# Patient Record
Sex: MALE | Race: WHITE | Employment: FULL TIME | URBAN - METROPOLITAN AREA
[De-identification: names, ages, dates, MRNs, and addresses within clinical notes are randomized per-mention and may not be internally consistent; named-entity substitution may affect disease eponyms.]

---

## 2020-09-28 NOTE — H&P (VIEW-ONLY)
[facility] CLINIC NOTE:  
History of Present Illness: 
 
CC: Right greater than left upper extremity pain and left greater than right upper extremity numbness tingling and weakness Meggan Llamas is a 63 y.o.  male who presents today for evaluation of chronic right greater than left upper extremity pain and left greater than right upper extremity numbness tingling and weakness. The patient states he has been dealing with these symptoms for approximately 5 years time. He states that over the length of time he was treated with over-the-counter ibuprofen, tumor rec and gabapentin that he is now requiring up to 1000 mg to 1200 mg a day. He states that he has performed physical therapy exercises daily and found that they have done little to relieve the radicular symptoms but do treat the overall neck pain. He states that his pain is worse when trying to lie flat or in the night and awakens him from sleep. He states that he has had to sleep on multiple pillows almost a 60 degrees angle for many years now to find any degree of comfort so that he can get any level of sleep. He currently rates his pain as a 6-7 out of 10 on the visual analog pain scale. He states that this pain radiates down his right upper extremity to his thumb index and middle finger greater than his left upper extremity. He also endorses numbness and tingling in a similar distribution and weakness worse on his left upper extremity than his right. He finds that his symptoms are exacerbated with activity. A few years back he underwent a course of multiple epidural steroid injections which did little to alleviate his pain discomfort and felt that they were complete waste. He is a non-smoker who is relatively healthy without any use of antiplatelet or anticoagulant medications. 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 He is accompanied to today's visit by his wife.   The patient has an MRI cervical spine without contrast performed on 9/1/2020 at [address] that demonstrates degenerative disc disease primarily affecting the C5-C6 and C6-C7 levels. At C5-C6 there is moderate degenerative disc disease with a broad-based disc osteophyte complex slightly eccentric to the left that results in moderate central spinal stenosis and left greater than right neuroforaminal stenosis C6-C7 there is a central and slightly right eccentric broad-based disc osteophyte complex that results in mild central spinal stenosis. At C5-C6 there is severe left and moderate right neuroforaminal stenosis. At C6-C7 there is severe right and moderate left neuroforaminal stenosis. The spinal cord is of normal caliber and has no evidence of cervical myelomalacia. Past Medical History:  
Diagnosis Date  Arthritis   
 cervical  
 BMI 25.0-25.9,adult BMI 25.1  Chronic pain   
 neck Past Surgical History:  
Procedure Laterality Date  HX KNEE ARTHROSCOPY Right 1987 and 1993 No Known Allergies Family History Problem Relation Age of Onset  Diabetes Father  Heart Disease Father  COPD Father  Heart Attack Father  Heart Disease Brother  Heart Attack Brother  No Known Problems Mother  No Known Problems Sister  No Known Problems Sister  Diabetes Brother Social History Socioeconomic History  Marital status:  Spouse name: Not on file  Number of children: Not on file  Years of education: Not on file  Highest education level: Not on file Occupational History  Not on file Social Needs  Financial resource strain: Not on file  Food insecurity Worry: Not on file Inability: Not on file  Transportation needs Medical: Not on file Non-medical: Not on file Tobacco Use  Smoking status: Never Smoker  Smokeless tobacco: Never Used Substance and Sexual Activity  Alcohol use:  Yes  
 Alcohol/week: 1.0 standard drinks Types: 1 Glasses of wine per week Comment: occassion  Drug use: No  
 Sexual activity: Not on file Lifestyle  Physical activity Days per week: Not on file Minutes per session: Not on file  Stress: Not on file Relationships  Social connections Talks on phone: Not on file Gets together: Not on file Attends Taoism service: Not on file Active member of club or organization: Not on file Attends meetings of clubs or organizations: Not on file Relationship status: Not on file  Intimate partner violence Fear of current or ex partner: Not on file Emotionally abused: Not on file Physically abused: Not on file Forced sexual activity: Not on file Other Topics Concern  Not on file Social History Narrative  Not on file Current Outpatient Medications Medication Sig Dispense Refill  gabapentin (NEURONTIN) 100 mg capsule Take 100 mg by mouth three (3) times daily.  ibuprofen (AdviL) 200 mg tablet Take 200 mg by mouth as needed for Pain. There is no problem list on file for this patient. ROS Review of Systems Constitutional:                    No recent weight changes, fever, fatigue, POS sleep difficulties, loss of appetite ENT/Mouth:  No hearing loss, ringing in the ears, chronic sinus problem, nose bleeds,sore throat, voice change, hoarseness, swollen glands in neck, or difficulties with chewing and swallowing. Cardiovascular:  No chest pain/angina pectoris, palpitations, swelling of feet/ankles/hands, or calf pain while walking. Respiratory: No chronic or frequent coughs, spitting up blood, shortness of breath, asthma, or wheezing. Gastrointestinal: No abdominal pain, heartburn, nausea, vomiting, constipation, or frequent diarrhea Genitourinary: No frequent urination, burning or painful urination, or blood in urine Musculoskeletal:   POS joint pain, POS stiffness/swelling, weakness of muscles, or muscle pain/cramp Integument:   No rash/itching Neurological:  No dizziness/vertigo, POS numbness/tingling sensation, tremors, POS weakness in limbs, frequent or recurring headaches, memory loss or confusion. Endocrine: No excessive thirst or urination, heat or cold intolerance. Physical Exam: 
Vitals:  
 09/28/20 0931 BP: 130/80 Pulse: 73 Temp: 97.7 °F (36.5 °C) TempSrc: Temporal  
SpO2: 98% Weight: 200 lb 6.4 oz (90.9 kg) Height: 6' (1.829 m) PainSc:   6-7 PainLoc: Neck Body mass index is 27.18 kg/m². General: No acute distress Head normocephalic and atraumatic Mood and affect appropriate CV: Regular rate Resp: No increased work of breathing Skin: warm and dry Awake, alert, and oriented Speech fluent Eyes open spontaneously Face symmetric and tongue midline on protrusion Sternocleidomastoid and trapezius 5/5 No mid-line cervical, thoracic, or lumbar tenderness to palpation Patient with strength exam as follows:  
Upper Extremities: Right Left Deltoid  5 5 Biceps  5 5 Triceps 5 5   5 5 Hand Intrinsics  5 5 Wrist flexors/extensors  5 5 Lower Extremities: 
 
  Hip Flex 5 5 Quads  5 5 Hamstrings 5 5 Dorsiflex 5 5 Plantarflex 5 5 EHL  5 5 Sensation grossly intact to light touch Negative Spurling maneuver bilaterally Good range of motion with respect to the cervical spine DTR 1+ in the bilateral upper extremities and 2+ in the bilateral lower extremities No clonus No Mai's sign present bilaterally Negative sitting straight leg raise test on the right and left Gait: normal nonantalgic gait, stands from a seated position without difficulty and ambulates independently Assessment & Plan: 
All Day is a 63 y.o.  male who presents today for evaluation of chronic right greater than left upper extremity pain and left greater than right upper extremity numbness tingling and weakness. I have independently reviewed and interpreted the patient's MRI cervical spine without contrast performed on 9/1/2020 at [address] that demonstrates degenerative disc disease primarily affecting the C5-C6 and C6-C7 levels. At C5-C6 there is moderate degenerative disc disease with a broad-based disc osteophyte complex slightly eccentric to the left that results in moderate central spinal stenosis and left greater than right neuroforaminal stenosis C6-C7 there is a central and slightly right eccentric broad-based disc osteophyte complex that results in mild central spinal stenosis. At C5-C6 there is severe left and moderate right neuroforaminal stenosis. At C6-C7 there is severe right and moderate left neuroforaminal stenosis. The spinal cord is of normal caliber and has no evidence of cervical myelomalacia. At this time in my opinion the patient has failed conservative medical management is suffering from bilateral neuroforaminal stenosis that is result of his cervical spinal degenerative disc disease and disc osteophyte complexes that result both neuroforaminal and spinal stenosis. Given that he is failed conservative medical management I recommend proceeding with a C5-C6 and C6-C7 anterior cervical discectomy and fusion. Anterior Cervical Fusion Procedures Consent: The relative risks of complication include but are not limited to infection, bleeding, spinal fluid leak, major vascular injury, increased pain, weakness, numbness, non fusion, instrumentation failure, need for re operation, postoperative hematoma that may require surgical evacuation, dysphonia, dysphagia, carotid, jugular, esophogeal and/or tracheal, paralysis, incontinence, impotence, heart attack, stroke and death were discussed with patient  And family members present.  They have been provided the opportunity to ask any questions regarding the surgical procedure. The patient and family members present expressed understanding and agree to proceed with surgery. ICD-10-CM ICD-9-CM 1. Degenerative disc disease, cervical  M50.30 722.4 2. Cervical spinal stenosis  M48.02 723.0 3. Cervical radiculopathy  M54.12 723.4 4. Neuroforaminal stenosis of cervical spine  M99.81 723.0 5. Disc-osteophyte complex  M50.30 722.4 M25.78 721.8 Notes are transcribed with [employer], a medical voice recording dictation service, and may contain minor errors. Rere Isbell. [facility] [address] [facility]

## 2020-10-12 ENCOUNTER — HOSPITAL ENCOUNTER (OUTPATIENT)
Dept: SURGERY | Age: 55
Discharge: HOME | End: 2020-10-12
Payer: COMMERCIAL

## 2020-10-12 VITALS
RESPIRATION RATE: 16 BRPM | OXYGEN SATURATION: 99 % | BODY MASS INDEX: 27.2 KG/M2 | SYSTOLIC BLOOD PRESSURE: 128 MMHG | HEIGHT: 72 IN | WEIGHT: 200.8 LBS | TEMPERATURE: 98 F | HEART RATE: 63 BPM | DIASTOLIC BLOOD PRESSURE: 69 MMHG

## 2020-10-12 LAB
ANION GAP SERPL CALC-SCNC: 3 MMOL/L (ref 7–16)
APPEARANCE UR: CLEAR
BACTERIA SPEC CULT: NORMAL
BASOPHILS # BLD: 0.1 K/UL (ref 0–0.2)
BASOPHILS NFR BLD: 1 % (ref 0–2)
BILIRUB UR QL: NEGATIVE
BUN SERPL-MCNC: 15 MG/DL (ref 6–23)
CALCIUM SERPL-MCNC: 9.2 MG/DL (ref 8.3–10.4)
CHLORIDE SERPL-SCNC: 105 MMOL/L (ref 98–107)
CO2 SERPL-SCNC: 33 MMOL/L (ref 21–32)
COLOR UR: YELLOW
CREAT SERPL-MCNC: 1.15 MG/DL (ref 0.8–1.5)
DIFFERENTIAL METHOD BLD: ABNORMAL
EOSINOPHIL # BLD: 0.1 K/UL (ref 0–0.8)
EOSINOPHIL NFR BLD: 2 % (ref 0.5–7.8)
ERYTHROCYTE [DISTWIDTH] IN BLOOD BY AUTOMATED COUNT: 11.7 % (ref 11.9–14.6)
GLUCOSE SERPL-MCNC: 82 MG/DL (ref 65–100)
GLUCOSE UR STRIP.AUTO-MCNC: NEGATIVE MG/DL
HCT VFR BLD AUTO: 43.6 % (ref 41.1–50.3)
HGB BLD-MCNC: 15.2 G/DL (ref 13.6–17.2)
HGB UR QL STRIP: NEGATIVE
IMM GRANULOCYTES # BLD AUTO: 0 K/UL (ref 0–0.5)
IMM GRANULOCYTES NFR BLD AUTO: 0 % (ref 0–5)
KETONES UR QL STRIP.AUTO: NEGATIVE MG/DL
LEUKOCYTE ESTERASE UR QL STRIP.AUTO: NEGATIVE
LYMPHOCYTES # BLD: 1.6 K/UL (ref 0.5–4.6)
LYMPHOCYTES NFR BLD: 37 % (ref 13–44)
MCH RBC QN AUTO: 30.7 PG (ref 26.1–32.9)
MCHC RBC AUTO-ENTMCNC: 34.9 G/DL (ref 31.4–35)
MCV RBC AUTO: 88.1 FL (ref 79.6–97.8)
MONOCYTES # BLD: 0.4 K/UL (ref 0.1–1.3)
MONOCYTES NFR BLD: 9 % (ref 4–12)
NEUTS SEG # BLD: 2.3 K/UL (ref 1.7–8.2)
NEUTS SEG NFR BLD: 51 % (ref 43–78)
NITRITE UR QL STRIP.AUTO: NEGATIVE
NRBC # BLD: 0 K/UL (ref 0–0.2)
PH UR STRIP: 8 [PH] (ref 5–9)
PLATELET # BLD AUTO: 215 K/UL (ref 150–450)
PMV BLD AUTO: 9.6 FL (ref 9.4–12.3)
POTASSIUM SERPL-SCNC: 4.1 MMOL/L (ref 3.5–5.1)
PROT UR STRIP-MCNC: NEGATIVE MG/DL
RBC # BLD AUTO: 4.95 M/UL (ref 4.23–5.6)
SERVICE CMNT-IMP: NORMAL
SODIUM SERPL-SCNC: 141 MMOL/L (ref 136–145)
SP GR UR REFRACTOMETRY: 1.01 (ref 1–1.02)
UROBILINOGEN UR QL STRIP.AUTO: 0.2 EU/DL (ref 0.2–1)
WBC # BLD AUTO: 4.4 K/UL (ref 4.3–11.1)

## 2020-10-12 PROCEDURE — 85025 COMPLETE CBC W/AUTO DIFF WBC: CPT

## 2020-10-12 PROCEDURE — 87641 MR-STAPH DNA AMP PROBE: CPT

## 2020-10-12 PROCEDURE — 81003 URINALYSIS AUTO W/O SCOPE: CPT

## 2020-10-12 PROCEDURE — 80048 BASIC METABOLIC PNL TOTAL CA: CPT

## 2020-10-12 PROCEDURE — 77030027138 HC INCENT SPIROMETER -A

## 2020-10-12 RX ORDER — ACETAMINOPHEN 500 MG
1000 TABLET ORAL
Status: ON HOLD | COMMUNITY
End: 2020-10-23 | Stop reason: SDUPTHER

## 2020-10-12 NOTE — PERIOP NOTES
Patient verified name & . Order to obtain consent not currently found in EHR. Pt verbalizes understanding of 1 visitor policy. TYPE  CASE:2              Orders: not yet received  Labs per Spine protocol:  CBC with dif, BMP, Mrsa/MSSA nasal swab and UA   Labs per anesthesia protocol: none  EKG/cardiac records  :  Not indicated  Glucose: not indicated    Patient verbalizes understanding to if not notified of appointment for COVID-19 swab within the 7 business days prior to surgery, call [phone]. Medication list updated today. Pt did not bring medication bottles or updated list today, but provided list of medications from memory to the best of their ability. Pt answered medical and surgical history questions to the best of their ability. Pt viewed Spine Pre-hab Video. All further questions were addressed. Pt was provided with antibacterial or non-moisturizing soap, Hibiclens, lSpine Recovery booklet and incentive spirometer. Handouts and all Surgery instructions provided to pt and pt verbalizes understanding. Patient Guide to Surgery Packet provided to patient. Packet includes Patient Guide to surgery handout, Facts about Pain Management handout, Facts about Urinary Catherization handout, Hand Hygiene handout, Patient Education and Teaching Sheet -Transfusion of Blood and Blood Products handout, and  [facility] frequent question and answer sheet. Guide reviewed with the patient and all questions answered to the satisfaction of the patient. Pt advised to visit www. [email or web address] for more educational information regarding anesthesia and to record any additional questions that arise so that it can be addressed by the anesthesiologist on the morning of surgery. Patient instructed on the following and verbalized understanding:  Arrive at MAIN entrance, time of arrival to be called the day before by 1700.   Responsible adult must drive patient to and from hospital, stay during surgery and 24 hours postoperatively. Npo after midnight including gum, mints and ice chips. Shower using hibiclens the night before and the morning of surgery. Hibiclens provided to the patient with handout and verbal instructions for use. Leave all valuables at home. Instructed on no jewelry or body piercings on the dos. Bring insurance card and ID. No perfumes, oil, powder, colognes, makeup or  lotions on the skin. Patient verbalized understanding of all instructions and provided all medical/health information to the best of their ability.

## 2020-10-12 NOTE — PERIOP NOTES
PLEASE CONTINUE TAKING ALL PRESCRIPTION MEDICATIONS UP TO THE DAY OF SURGERY UNLESS OTHERWISE DIRECTED BELOW. DISCONTINUE ALL vitamins and supplements 7 days prior to surgery. DISCONTINUE Non-Steriodal Anti-Inflammatory (NSAIDS) such as Advil and Aleve 5 days prior to surgery. Home Medications to take  the day of surgery    gabapentin / Neurontin           Home Medications   to Hold   Ibuprofen - hold 5 days prior to surgery   DISCONTINUE ALL vitamins and supplements 7 days prior to surgery. DISCONTINUE Non-Steriodal Anti-Inflammatory (NSAIDS) such as Advil and Aleve 5 days prior to surgery. Comments          *Visitor policy of 1 visitor per patient discussed. Please do not bring home medications with you on the day of surgery unless otherwise directed by your nurse. If you are instructed to bring home medications, please give them to your nurse as they will be administered by the nursing staff. If you have any questions, please call [facility] Danyelle Valencia [phone] or [address] [place] [address] [phone]. A copy of this note was provided to the patient for reference.

## 2020-10-12 NOTE — PERIOP NOTES
Recent Results (from the past 12 hour(s))   CBC WITH AUTOMATED DIFF    Collection Time: 10/12/20  1:23 PM   Result Value Ref Range    WBC 4.4 4.3 - 11.1 K/uL    RBC 4.95 4.23 - 5.6 M/uL    HGB 15.2 13.6 - 17.2 g/dL    HCT 43.6 41.1 - 50.3 %    MCV 88.1 79.6 - 97.8 FL    MCH 30.7 26.1 - 32.9 PG    MCHC 34.9 31.4 - 35.0 g/dL    RDW 11.7 (L) 11.9 - 14.6 %    PLATELET 009 935 - 185 K/uL    MPV 9.6 9.4 - 12.3 FL    ABSOLUTE NRBC 0.00 0.0 - 0.2 K/uL    DF AUTOMATED      NEUTROPHILS 51 43 - 78 %    LYMPHOCYTES 37 13 - 44 %    MONOCYTES 9 4.0 - 12.0 %    EOSINOPHILS 2 0.5 - 7.8 %    BASOPHILS 1 0.0 - 2.0 %    IMMATURE GRANULOCYTES 0 0.0 - 5.0 %    ABS. NEUTROPHILS 2.3 1.7 - 8.2 K/UL    ABS. LYMPHOCYTES 1.6 0.5 - 4.6 K/UL    ABS. MONOCYTES 0.4 0.1 - 1.3 K/UL    ABS. EOSINOPHILS 0.1 0.0 - 0.8 K/UL    ABS. BASOPHILS 0.1 0.0 - 0.2 K/UL    ABS. IMM. GRANS. 0.0 0.0 - 0.5 K/UL   METABOLIC PANEL, BASIC    Collection Time: 10/12/20  1:23 PM   Result Value Ref Range    Sodium 141 136 - 145 mmol/L    Potassium 4.1 3.5 - 5.1 mmol/L    Chloride 105 98 - 107 mmol/L    CO2 33 (H) 21 - 32 mmol/L    Anion gap 3 (L) 7 - 16 mmol/L    Glucose 82 65 - 100 mg/dL    BUN 15 6 - 23 MG/DL    Creatinine 1.15 0.8 - 1.5 MG/DL    GFR est AA >60 >60 ml/min/1.73m2    GFR est non-AA >60 >60 ml/min/1.73m2    Calcium 9.2 8.3 - 10.4 MG/DL   MSSA/MRSA SC BY PCR, NASAL SWAB    Collection Time: 10/12/20  1:23 PM    Specimen: Nasal swab   Result Value Ref Range    Special Requests: NO SPECIAL REQUESTS      Culture result:        SA target not detected. A MRSA NEGATIVE, SA NEGATIVE test result does not preclude MRSA or SA nasal colonization.    URINALYSIS W/ RFLX MICROSCOPIC    Collection Time: 10/12/20  1:23 PM   Result Value Ref Range    Color YELLOW      Appearance CLEAR      Specific gravity 1.008 1.001 - 1.023      pH (UA) 8.0 5.0 - 9.0      Protein Negative NEG mg/dL    Glucose Negative mg/dL    Ketone Negative NEG mg/dL Bilirubin Negative NEG      Blood Negative NEG      Urobilinogen 0.2 0.2 - 1.0 EU/dL    Nitrites Negative NEG      Leukocyte Esterase Negative NEG      Lab results reviewed.

## 2020-10-21 ENCOUNTER — ANESTHESIA EVENT (OUTPATIENT)
Dept: SURGERY | Age: 55
DRG: 473 | End: 2020-10-21
Payer: COMMERCIAL

## 2020-10-22 ENCOUNTER — ANESTHESIA (OUTPATIENT)
Dept: SURGERY | Age: 55
DRG: 473 | End: 2020-10-22
Payer: COMMERCIAL

## 2020-10-22 ENCOUNTER — APPOINTMENT (OUTPATIENT)
Dept: GENERAL RADIOLOGY | Age: 55
DRG: 473 | End: 2020-10-22
Attending: NEUROLOGICAL SURGERY
Payer: COMMERCIAL

## 2020-10-22 ENCOUNTER — HOSPITAL ENCOUNTER (INPATIENT)
Age: 55
LOS: 1 days | Discharge: HOME | DRG: 473 | End: 2020-10-23
Attending: NEUROLOGICAL SURGERY | Admitting: NEUROLOGICAL SURGERY
Payer: COMMERCIAL

## 2020-10-22 DIAGNOSIS — M50.30 DISC-OSTEOPHYTE COMPLEX: ICD-10-CM

## 2020-10-22 DIAGNOSIS — M54.12 CERVICAL RADICULOPATHY: ICD-10-CM

## 2020-10-22 DIAGNOSIS — M48.02 CERVICAL SPINAL STENOSIS: Primary | ICD-10-CM

## 2020-10-22 DIAGNOSIS — M50.30 DEGENERATIVE DISC DISEASE, CERVICAL: ICD-10-CM

## 2020-10-22 DIAGNOSIS — M48.02 NEUROFORAMINAL STENOSIS OF CERVICAL SPINE: ICD-10-CM

## 2020-10-22 DIAGNOSIS — M25.78 DISC-OSTEOPHYTE COMPLEX: ICD-10-CM

## 2020-10-22 LAB
ABO + RH BLD: NORMAL
BLOOD GROUP ANTIBODIES SERPL: NORMAL
SPECIMEN EXP DATE BLD: NORMAL

## 2020-10-22 PROCEDURE — 86900 BLOOD TYPING SEROLOGIC ABO: CPT

## 2020-10-22 PROCEDURE — 0RT30ZZ RESECTION OF CERVICAL VERTEBRAL DISC, OPEN APPROACH: ICD-10-PCS | Performed by: NEUROLOGICAL SURGERY

## 2020-10-22 PROCEDURE — 77030039155 HC CGE SPN ANT CERV TRITANIUM STRY -G: Performed by: NEUROLOGICAL SURGERY

## 2020-10-22 PROCEDURE — 77030038552 HC DRN WND MDII -A: Performed by: NEUROLOGICAL SURGERY

## 2020-10-22 PROCEDURE — 77030009868 HC PIN DISTR CASPR AESC -B: Performed by: NEUROLOGICAL SURGERY

## 2020-10-22 PROCEDURE — 74011250636 HC RX REV CODE- 250/636: Performed by: NEUROLOGICAL SURGERY

## 2020-10-22 PROCEDURE — 74011250637 HC RX REV CODE- 250/637: Performed by: ANESTHESIOLOGY

## 2020-10-22 PROCEDURE — 74011250636 HC RX REV CODE- 250/636: Performed by: REGISTERED NURSE

## 2020-10-22 PROCEDURE — 77030027138 HC INCENT SPIROMETER -A

## 2020-10-22 PROCEDURE — 74011000272 HC RX REV CODE- 272: Performed by: NEUROLOGICAL SURGERY

## 2020-10-22 PROCEDURE — 74011250637 HC RX REV CODE- 250/637: Performed by: NEUROLOGICAL SURGERY

## 2020-10-22 PROCEDURE — 74011250636 HC RX REV CODE- 250/636: Performed by: ANESTHESIOLOGY

## 2020-10-22 PROCEDURE — C1713 ANCHOR/SCREW BN/BN,TIS/BN: HCPCS | Performed by: NEUROLOGICAL SURGERY

## 2020-10-22 PROCEDURE — 77030040830 HC CATH URETH FOL MDII -A: Performed by: NEUROLOGICAL SURGERY

## 2020-10-22 PROCEDURE — 77030025420 HC BUR NEUR TPS STRY -C: Performed by: NEUROLOGICAL SURGERY

## 2020-10-22 PROCEDURE — 76060000039 HC ANESTHESIA 4 TO 4.5 HR: Performed by: NEUROLOGICAL SURGERY

## 2020-10-22 PROCEDURE — 77030020268 HC MISC GENERAL SUPPLY: Performed by: NEUROLOGICAL SURGERY

## 2020-10-22 PROCEDURE — 77030003666 HC NDL SPINAL BD -A: Performed by: NEUROLOGICAL SURGERY

## 2020-10-22 PROCEDURE — 65270000029 HC RM PRIVATE

## 2020-10-22 PROCEDURE — 74011000250 HC RX REV CODE- 250: Performed by: NEUROLOGICAL SURGERY

## 2020-10-22 PROCEDURE — 01N10ZZ RELEASE CERVICAL NERVE, OPEN APPROACH: ICD-10-PCS | Performed by: NEUROLOGICAL SURGERY

## 2020-10-22 PROCEDURE — 77030040106 HC FCPS BIPOLAR DISP INLC -D: Performed by: NEUROLOGICAL SURGERY

## 2020-10-22 PROCEDURE — 77030028270 HC SRGFL HEMSTAT MTRX J&J -C: Performed by: NEUROLOGICAL SURGERY

## 2020-10-22 PROCEDURE — 77030029099 HC BN WAX SSPC -A: Performed by: NEUROLOGICAL SURGERY

## 2020-10-22 PROCEDURE — 74011000250 HC RX REV CODE- 250: Performed by: REGISTERED NURSE

## 2020-10-22 PROCEDURE — 77030014007 HC SPNG HEMSTAT J&J -B: Performed by: NEUROLOGICAL SURGERY

## 2020-10-22 PROCEDURE — 77030003029 HC SUT VCRL J&J -B: Performed by: NEUROLOGICAL SURGERY

## 2020-10-22 PROCEDURE — 77030002933 HC SUT MCRYL J&J -A: Performed by: NEUROLOGICAL SURGERY

## 2020-10-22 PROCEDURE — 77030011267 HC ELECTRD BLD COVD -A: Performed by: NEUROLOGICAL SURGERY

## 2020-10-22 PROCEDURE — 77030010507 HC ADH SKN DERMBND J&J -B: Performed by: NEUROLOGICAL SURGERY

## 2020-10-22 PROCEDURE — 77030019908 HC STETH ESOPH SIMS -A: Performed by: ANESTHESIOLOGY

## 2020-10-22 PROCEDURE — 00NW0ZZ RELEASE CERVICAL SPINAL CORD, OPEN APPROACH: ICD-10-PCS | Performed by: NEUROLOGICAL SURGERY

## 2020-10-22 PROCEDURE — 76010000175 HC OR TIME 4 TO 4.5 HR INTENSV-TIER 1: Performed by: NEUROLOGICAL SURGERY

## 2020-10-22 PROCEDURE — 77030034475 HC MISC IMPL SPN: Performed by: NEUROLOGICAL SURGERY

## 2020-10-22 PROCEDURE — 77030039425 HC BLD LARYNG TRULITE DISP TELE -A: Performed by: ANESTHESIOLOGY

## 2020-10-22 PROCEDURE — 77030012894: Performed by: NEUROLOGICAL SURGERY

## 2020-10-22 PROCEDURE — 77030037088 HC TUBE ENDOTRACH ORAL NSL COVD-A: Performed by: ANESTHESIOLOGY

## 2020-10-22 PROCEDURE — 72040 X-RAY EXAM NECK SPINE 2-3 VW: CPT

## 2020-10-22 PROCEDURE — 2709999900 HC NON-CHARGEABLE SUPPLY

## 2020-10-22 PROCEDURE — 76210000016 HC OR PH I REC 1 TO 1.5 HR: Performed by: NEUROLOGICAL SURGERY

## 2020-10-22 PROCEDURE — 2709999900 HC NON-CHARGEABLE SUPPLY: Performed by: NEUROLOGICAL SURGERY

## 2020-10-22 PROCEDURE — 0RG20A0 FUSION OF 2 OR MORE CERVICAL VERTEBRAL JOINTS WITH INTERBODY FUSION DEVICE, ANTERIOR APPROACH, ANTERIOR COLUMN, OPEN APPROACH: ICD-10-PCS | Performed by: NEUROLOGICAL SURGERY

## 2020-10-22 DEVICE — ANTERIOR CERVICAL CAGE
Type: IMPLANTABLE DEVICE | Site: SPINE CERVICAL | Status: FUNCTIONAL
Brand: TRITANIUM C

## 2020-10-22 DEVICE — SCREW SPNL L14MM DIA4MM SELF STARTING VAR ANT CERV TI OZARK: Type: IMPLANTABLE DEVICE | Site: SPINE CERVICAL | Status: FUNCTIONAL

## 2020-10-22 DEVICE — BIO DBM PUTTY
Type: IMPLANTABLE DEVICE | Site: SPINE CERVICAL | Status: FUNCTIONAL
Brand: BIO DBM

## 2020-10-22 RX ORDER — HALOPERIDOL 5 MG/ML
1 INJECTION INTRAMUSCULAR
Status: DISCONTINUED | OUTPATIENT
Start: 2020-10-22 | End: 2020-10-22 | Stop reason: HOSPADM

## 2020-10-22 RX ORDER — HYDROMORPHONE HYDROCHLORIDE 2 MG/ML
0.5 INJECTION, SOLUTION INTRAMUSCULAR; INTRAVENOUS; SUBCUTANEOUS
Status: DISCONTINUED | OUTPATIENT
Start: 2020-10-22 | End: 2020-10-22 | Stop reason: HOSPADM

## 2020-10-22 RX ORDER — GABAPENTIN 300 MG/1
300 CAPSULE ORAL
Status: COMPLETED | OUTPATIENT
Start: 2020-10-22 | End: 2020-10-22

## 2020-10-22 RX ORDER — ONDANSETRON 2 MG/ML
INJECTION INTRAMUSCULAR; INTRAVENOUS AS NEEDED
Status: DISCONTINUED | OUTPATIENT
Start: 2020-10-22 | End: 2020-10-22 | Stop reason: HOSPADM

## 2020-10-22 RX ORDER — CEFAZOLIN SODIUM/WATER 2 G/20 ML
2 SYRINGE (ML) INTRAVENOUS ONCE
Status: COMPLETED | OUTPATIENT
Start: 2020-10-22 | End: 2020-10-22

## 2020-10-22 RX ORDER — PROPOFOL 10 MG/ML
INJECTION, EMULSION INTRAVENOUS AS NEEDED
Status: DISCONTINUED | OUTPATIENT
Start: 2020-10-22 | End: 2020-10-22 | Stop reason: HOSPADM

## 2020-10-22 RX ORDER — SODIUM CHLORIDE 0.9 % (FLUSH) 0.9 %
5-40 SYRINGE (ML) INJECTION AS NEEDED
Status: DISCONTINUED | OUTPATIENT
Start: 2020-10-22 | End: 2020-10-22 | Stop reason: HOSPADM

## 2020-10-22 RX ORDER — CEFAZOLIN SODIUM/WATER 2 G/20 ML
2 SYRINGE (ML) INTRAVENOUS EVERY 8 HOURS
Status: COMPLETED | OUTPATIENT
Start: 2020-10-22 | End: 2020-10-23

## 2020-10-22 RX ORDER — LIDOCAINE HYDROCHLORIDE 10 MG/ML
0.1 INJECTION INFILTRATION; PERINEURAL AS NEEDED
Status: DISCONTINUED | OUTPATIENT
Start: 2020-10-22 | End: 2020-10-22 | Stop reason: HOSPADM

## 2020-10-22 RX ORDER — NEOSTIGMINE METHYLSULFATE 1 MG/ML
INJECTION, SOLUTION INTRAVENOUS AS NEEDED
Status: DISCONTINUED | OUTPATIENT
Start: 2020-10-22 | End: 2020-10-22 | Stop reason: HOSPADM

## 2020-10-22 RX ORDER — MIDAZOLAM HYDROCHLORIDE 1 MG/ML
2 INJECTION, SOLUTION INTRAMUSCULAR; INTRAVENOUS
Status: DISCONTINUED | OUTPATIENT
Start: 2020-10-22 | End: 2020-10-22 | Stop reason: HOSPADM

## 2020-10-22 RX ORDER — VANCOMYCIN HYDROCHLORIDE 1 G/20ML
INJECTION, POWDER, LYOPHILIZED, FOR SOLUTION INTRAVENOUS AS NEEDED
Status: DISCONTINUED | OUTPATIENT
Start: 2020-10-22 | End: 2020-10-22 | Stop reason: HOSPADM

## 2020-10-22 RX ORDER — HYDROMORPHONE HYDROCHLORIDE 1 MG/ML
1 INJECTION, SOLUTION INTRAMUSCULAR; INTRAVENOUS; SUBCUTANEOUS
Status: DISCONTINUED | OUTPATIENT
Start: 2020-10-22 | End: 2020-10-23 | Stop reason: HOSPADM

## 2020-10-22 RX ORDER — NALOXONE HYDROCHLORIDE 0.4 MG/ML
0.1 INJECTION, SOLUTION INTRAMUSCULAR; INTRAVENOUS; SUBCUTANEOUS
Status: DISCONTINUED | OUTPATIENT
Start: 2020-10-22 | End: 2020-10-22 | Stop reason: HOSPADM

## 2020-10-22 RX ORDER — SODIUM CHLORIDE 0.9 % (FLUSH) 0.9 %
5-40 SYRINGE (ML) INJECTION EVERY 8 HOURS
Status: DISCONTINUED | OUTPATIENT
Start: 2020-10-22 | End: 2020-10-22 | Stop reason: HOSPADM

## 2020-10-22 RX ORDER — GLYCOPYRROLATE 0.2 MG/ML
INJECTION INTRAMUSCULAR; INTRAVENOUS AS NEEDED
Status: DISCONTINUED | OUTPATIENT
Start: 2020-10-22 | End: 2020-10-22 | Stop reason: HOSPADM

## 2020-10-22 RX ORDER — KETOROLAC TROMETHAMINE 30 MG/ML
INJECTION, SOLUTION INTRAMUSCULAR; INTRAVENOUS AS NEEDED
Status: DISCONTINUED | OUTPATIENT
Start: 2020-10-22 | End: 2020-10-22 | Stop reason: HOSPADM

## 2020-10-22 RX ORDER — SODIUM CHLORIDE 0.9 % (FLUSH) 0.9 %
5-40 SYRINGE (ML) INJECTION EVERY 8 HOURS
Status: DISCONTINUED | OUTPATIENT
Start: 2020-10-22 | End: 2020-10-23 | Stop reason: HOSPADM

## 2020-10-22 RX ORDER — NALOXONE HYDROCHLORIDE 0.4 MG/ML
0.4 INJECTION, SOLUTION INTRAMUSCULAR; INTRAVENOUS; SUBCUTANEOUS AS NEEDED
Status: DISCONTINUED | OUTPATIENT
Start: 2020-10-22 | End: 2020-10-23 | Stop reason: HOSPADM

## 2020-10-22 RX ORDER — FENTANYL CITRATE 50 UG/ML
INJECTION, SOLUTION INTRAMUSCULAR; INTRAVENOUS AS NEEDED
Status: DISCONTINUED | OUTPATIENT
Start: 2020-10-22 | End: 2020-10-22 | Stop reason: HOSPADM

## 2020-10-22 RX ORDER — DEXAMETHASONE SODIUM PHOSPHATE 4 MG/ML
INJECTION, SOLUTION INTRA-ARTICULAR; INTRALESIONAL; INTRAMUSCULAR; INTRAVENOUS; SOFT TISSUE AS NEEDED
Status: DISCONTINUED | OUTPATIENT
Start: 2020-10-22 | End: 2020-10-22 | Stop reason: HOSPADM

## 2020-10-22 RX ORDER — LIDOCAINE HYDROCHLORIDE 20 MG/ML
INJECTION, SOLUTION EPIDURAL; INFILTRATION; INTRACAUDAL; PERINEURAL AS NEEDED
Status: DISCONTINUED | OUTPATIENT
Start: 2020-10-22 | End: 2020-10-22 | Stop reason: HOSPADM

## 2020-10-22 RX ORDER — SODIUM CHLORIDE 0.9 % (FLUSH) 0.9 %
5-40 SYRINGE (ML) INJECTION AS NEEDED
Status: DISCONTINUED | OUTPATIENT
Start: 2020-10-22 | End: 2020-10-23 | Stop reason: HOSPADM

## 2020-10-22 RX ORDER — HYDROCODONE BITARTRATE AND ACETAMINOPHEN 10; 325 MG/1; MG/1
1 TABLET ORAL
Status: DISCONTINUED | OUTPATIENT
Start: 2020-10-22 | End: 2020-10-23 | Stop reason: HOSPADM

## 2020-10-22 RX ORDER — OXYCODONE HYDROCHLORIDE 5 MG/1
5 TABLET ORAL
Status: DISCONTINUED | OUTPATIENT
Start: 2020-10-22 | End: 2020-10-22 | Stop reason: HOSPADM

## 2020-10-22 RX ORDER — DIPHENHYDRAMINE HYDROCHLORIDE 50 MG/ML
12.5 INJECTION, SOLUTION INTRAMUSCULAR; INTRAVENOUS
Status: DISCONTINUED | OUTPATIENT
Start: 2020-10-22 | End: 2020-10-22 | Stop reason: HOSPADM

## 2020-10-22 RX ORDER — EPHEDRINE SULFATE/0.9% NACL/PF 50 MG/5 ML
SYRINGE (ML) INTRAVENOUS AS NEEDED
Status: DISCONTINUED | OUTPATIENT
Start: 2020-10-22 | End: 2020-10-22 | Stop reason: HOSPADM

## 2020-10-22 RX ORDER — CYCLOBENZAPRINE HCL 10 MG
10 TABLET ORAL
Status: DISCONTINUED | OUTPATIENT
Start: 2020-10-22 | End: 2020-10-23 | Stop reason: HOSPADM

## 2020-10-22 RX ORDER — ROCURONIUM BROMIDE 10 MG/ML
INJECTION, SOLUTION INTRAVENOUS AS NEEDED
Status: DISCONTINUED | OUTPATIENT
Start: 2020-10-22 | End: 2020-10-22 | Stop reason: HOSPADM

## 2020-10-22 RX ORDER — ACETAMINOPHEN 500 MG
1000 TABLET ORAL ONCE
Status: COMPLETED | OUTPATIENT
Start: 2020-10-22 | End: 2020-10-22

## 2020-10-22 RX ORDER — FLUMAZENIL 0.1 MG/ML
0.2 INJECTION INTRAVENOUS
Status: DISCONTINUED | OUTPATIENT
Start: 2020-10-22 | End: 2020-10-22 | Stop reason: HOSPADM

## 2020-10-22 RX ORDER — SODIUM CHLORIDE, SODIUM LACTATE, POTASSIUM CHLORIDE, CALCIUM CHLORIDE 600; 310; 30; 20 MG/100ML; MG/100ML; MG/100ML; MG/100ML
100 INJECTION, SOLUTION INTRAVENOUS CONTINUOUS
Status: DISCONTINUED | OUTPATIENT
Start: 2020-10-22 | End: 2020-10-22 | Stop reason: HOSPADM

## 2020-10-22 RX ORDER — SODIUM CHLORIDE, SODIUM LACTATE, POTASSIUM CHLORIDE, CALCIUM CHLORIDE 600; 310; 30; 20 MG/100ML; MG/100ML; MG/100ML; MG/100ML
75 INJECTION, SOLUTION INTRAVENOUS CONTINUOUS
Status: DISCONTINUED | OUTPATIENT
Start: 2020-10-22 | End: 2020-10-22 | Stop reason: HOSPADM

## 2020-10-22 RX ORDER — ONDANSETRON 2 MG/ML
4 INJECTION INTRAMUSCULAR; INTRAVENOUS
Status: DISCONTINUED | OUTPATIENT
Start: 2020-10-22 | End: 2020-10-23 | Stop reason: HOSPADM

## 2020-10-22 RX ORDER — BUPIVACAINE HYDROCHLORIDE AND EPINEPHRINE 2.5; 5 MG/ML; UG/ML
INJECTION, SOLUTION EPIDURAL; INFILTRATION; INTRACAUDAL; PERINEURAL AS NEEDED
Status: DISCONTINUED | OUTPATIENT
Start: 2020-10-22 | End: 2020-10-22 | Stop reason: HOSPADM

## 2020-10-22 RX ADMIN — Medication 1 SPRAY: at 17:09

## 2020-10-22 RX ADMIN — FENTANYL CITRATE 25 MCG: 50 INJECTION INTRAMUSCULAR; INTRAVENOUS at 13:50

## 2020-10-22 RX ADMIN — HYDROMORPHONE HYDROCHLORIDE 0.5 MG: 2 INJECTION INTRAMUSCULAR; INTRAVENOUS; SUBCUTANEOUS at 14:56

## 2020-10-22 RX ADMIN — CEFAZOLIN SODIUM 2 G: 100 INJECTION, POWDER, LYOPHILIZED, FOR SOLUTION INTRAVENOUS at 22:14

## 2020-10-22 RX ADMIN — CYCLOBENZAPRINE 10 MG: 10 TABLET, FILM COATED ORAL at 22:13

## 2020-10-22 RX ADMIN — FENTANYL CITRATE 25 MCG: 50 INJECTION INTRAMUSCULAR; INTRAVENOUS at 13:33

## 2020-10-22 RX ADMIN — FENTANYL CITRATE 25 MCG: 50 INJECTION INTRAMUSCULAR; INTRAVENOUS at 13:30

## 2020-10-22 RX ADMIN — PHENYLEPHRINE HYDROCHLORIDE 60 MCG: 10 INJECTION INTRAVENOUS at 12:05

## 2020-10-22 RX ADMIN — Medication 3 AMPULE: at 09:40

## 2020-10-22 RX ADMIN — KETOROLAC TROMETHAMINE 30 MG: 30 INJECTION, SOLUTION INTRAMUSCULAR; INTRAVENOUS at 13:50

## 2020-10-22 RX ADMIN — Medication 5 ML: at 17:09

## 2020-10-22 RX ADMIN — PHENYLEPHRINE HYDROCHLORIDE 60 MCG: 10 INJECTION INTRAVENOUS at 12:21

## 2020-10-22 RX ADMIN — SODIUM CHLORIDE, SODIUM LACTATE, POTASSIUM CHLORIDE, AND CALCIUM CHLORIDE 100 ML/HR: 600; 310; 30; 20 INJECTION, SOLUTION INTRAVENOUS at 10:16

## 2020-10-22 RX ADMIN — PROPOFOL 200 MG: 10 INJECTION, EMULSION INTRAVENOUS at 10:38

## 2020-10-22 RX ADMIN — ONDANSETRON 4 MG: 2 INJECTION INTRAMUSCULAR; INTRAVENOUS at 10:44

## 2020-10-22 RX ADMIN — Medication 3 MG: at 14:02

## 2020-10-22 RX ADMIN — ONDANSETRON 4 MG: 2 INJECTION INTRAMUSCULAR; INTRAVENOUS at 23:05

## 2020-10-22 RX ADMIN — Medication 1 AMPULE: at 22:13

## 2020-10-22 RX ADMIN — ROCURONIUM BROMIDE 50 MG: 10 INJECTION, SOLUTION INTRAVENOUS at 10:38

## 2020-10-22 RX ADMIN — Medication 2 G: at 10:44

## 2020-10-22 RX ADMIN — LIDOCAINE HYDROCHLORIDE 100 MG: 20 INJECTION, SOLUTION EPIDURAL; INFILTRATION; INTRACAUDAL; PERINEURAL at 10:38

## 2020-10-22 RX ADMIN — PHENYLEPHRINE HYDROCHLORIDE 120 MCG: 10 INJECTION INTRAVENOUS at 12:10

## 2020-10-22 RX ADMIN — FENTANYL CITRATE 100 MCG: 50 INJECTION INTRAMUSCULAR; INTRAVENOUS at 10:38

## 2020-10-22 RX ADMIN — ROCURONIUM BROMIDE 15 MG: 10 INJECTION, SOLUTION INTRAVENOUS at 11:57

## 2020-10-22 RX ADMIN — GLYCOPYRROLATE 0.4 MG: 0.2 INJECTION, SOLUTION INTRAMUSCULAR; INTRAVENOUS at 14:02

## 2020-10-22 RX ADMIN — ROCURONIUM BROMIDE 10 MG: 10 INJECTION, SOLUTION INTRAVENOUS at 12:11

## 2020-10-22 RX ADMIN — Medication 5 MG: at 11:58

## 2020-10-22 RX ADMIN — ACETAMINOPHEN 1000 MG: 500 TABLET, FILM COATED ORAL at 09:41

## 2020-10-22 RX ADMIN — FENTANYL CITRATE 25 MCG: 50 INJECTION INTRAMUSCULAR; INTRAVENOUS at 13:43

## 2020-10-22 RX ADMIN — Medication 10 ML: at 22:14

## 2020-10-22 RX ADMIN — DEXAMETHASONE SODIUM PHOSPHATE 10 MG: 4 INJECTION, SOLUTION INTRAMUSCULAR; INTRAVENOUS at 10:44

## 2020-10-22 RX ADMIN — GABAPENTIN 300 MG: 300 CAPSULE ORAL at 10:10

## 2020-10-22 RX ADMIN — HYDROCODONE BITARTRATE AND ACETAMINOPHEN 1 TABLET: 10; 325 TABLET ORAL at 17:08

## 2020-10-22 NOTE — PROGRESS NOTES
- Visited as requested, to offer spiritual support and prayer prior to surgery. Pt's wife was present.     Brant Terry MDiv, [address] [place] [address]

## 2020-10-22 NOTE — ANESTHESIA PREPROCEDURE EVALUATION
Anesthetic History   No history of anesthetic complications            Review of Systems / Medical History  Patient summary reviewed and pertinent labs reviewed    Pulmonary  Within defined limits                 Neuro/Psych             Comments: Cervical spinal stenosis with radiulopathy Cardiovascular  Within defined limits                Exercise tolerance: >4 METS  Comments: Denies recent CP, SOB or Palpitations   GI/Hepatic/Renal  Within defined limits              Endo/Other  Within defined limits           Other Findings              Physical Exam    Airway  Mallampati: II  TM Distance: > 6 cm  Neck ROM: normal range of motion   Mouth opening: Normal    Comments: Fairly good range of motion in preop and no exacerbation of symptoms on with neck extension.  Cardiovascular  Regular rate and rhythm,  S1 and S2 normal,  no murmur, click, rub, or gallop             Dental  No notable dental hx       Pulmonary  Breath sounds clear to auscultation               Abdominal  GI exam deferred       Other Findings            Anesthetic Plan    ASA: 1  Anesthesia type: general          Induction: Intravenous  Anesthetic plan and risks discussed with: Patient

## 2020-10-22 NOTE — INTERVAL H&P NOTE
Update History & Physical    The Patient's History and Physical of September 28, 2020,   this was reviewed with the patient and I examined the patient. There was no change. The surgical site was confirmed by the patient and me. Plan:  The risk, benefits, expected outcome, and alternative to the recommended procedure have been discussed with the patient. Patient understands and wants to proceed with the procedure.     Electronically signed by Mauricio Medina MD on 10/22/2020 at 10:07 AM

## 2020-10-22 NOTE — ANESTHESIA POSTPROCEDURE EVALUATION
Procedure(s):  C5-6 AND C6-7 ANTERIOR CERVICAL DISCECTOMY AND FUSION. general    Anesthesia Post Evaluation      Multimodal analgesia: multimodal analgesia used between 6 hours prior to anesthesia start to PACU discharge  Patient location during evaluation: PACU  Patient participation: complete - patient participated  Level of consciousness: awake  Pain management: adequate  Airway patency: patent  Anesthetic complications: no  Cardiovascular status: acceptable  Respiratory status: spontaneous ventilation and acceptable  Hydration status: acceptable  Post anesthesia nausea and vomiting:  none      INITIAL Post-op Vital signs:   Vitals Value Taken Time   /56 10/22/2020  3:50 PM   Temp 36.8 °C (98.2 °F) 10/22/2020  3:35 PM   Pulse 81 10/22/2020  3:50 PM   Resp 18 10/22/2020  3:35 PM   SpO2 100 % 10/22/2020  3:50 PM   Vitals shown include unvalidated device data.

## 2020-10-22 NOTE — OP NOTES
NEUROSURGERY OPERATIVE REPORT:      Patient Name: Ruben Toney    Patient MRN: [id number]    Patient YOB: 1965    Date of Procedure: 10/22/2020    Pre-Procedure Diagnosis:   1. Degenerative disc disease C5-C6 and C6-C7  2. Cervical Spinal stenosis C5-C6 and C6-C7   3. Cervical Radiculopathy   4. Neuroforaminal stenosis cervical spine   5. Disc-osteophyte complex     Post-Procedure Diagnosis: SAME AS ABOVE      Procedure:   1  C5-C6 and C6-C7  COMPLETE ANTERIOR CERVICAL DISKECTOMY, OSTEOPHYTECTOMY FOR NERVE ROOT AND SPINAL CORD DECOMPRESSION  2. C5-C6 and C6-C7 INTERBODY FIXATION WITH KISHA TRITANIUM LORDOTIC DEVICE/GRAFTS, AUTOGRAFT, and DBM  3. ANTERIOR PLATE AND SCREW FIXATION FROM C5,C6, and C7 K-2 OZARK SYSTEM   4. BONE MARROW ASPIRATION   5. INTRAOPERATIVE MICROSCOPE (MICROSURGICAL TECHNIQUE  6. INTRAOPERATIVE FLUOROSCOPY       Anesthesia: General Endotracheal anesthesia      Blood Less: 25 cc     Surgeon: Otilia Aragon. Maria Teresa Gudino MD     Anesthesiologist: Koffi Hernandez. Shelley Salguero MD      Surgical Staff:   See Operative Record      Procedure in Detail:   The patient was transported to the OR and placed under general endotracheal anesthesia. Next, the patient was prepped and draped in a sterile fashion. A surgical pause time-out was performed at the beginning of the case prior to incision confirming procedure, sterility and functionality of instruments, surgical site, stability of patient, and anti-biotic administration. A transverse incision was made in the right anterior cervical area two finger breadths above the clavicle. A subcutaneous flap was elevated. The platysma was opened vertically. The cervical investing fascia was opened along the medial border of the sternocleidomastoid muscle. The fascial planes between the carotid sheath and paratracheal fascia were opened with blunt and sharp dissection. The prevertebral fascia was identified and opened sharply.  The longus coli muscles were cauterized along the medial border. Intraoperative C-arm fluoroscopic imaging was used to identify the appropriate level. Next, the C5-C6 disk was incised with a 15 blade. The disk material was removed with pituitary rongeurs and curettes. The ventral osteophyte overlying the disc space from the C5 vertebral body was removed with Kerrison rongeurs which was morselized for autograft. The intraoperative microscope was then brought in to complete the remainder of the discectomy. The cartilaginous endplates were removed with the Edwin Drill. The posterolateral osteophytes were also drilled down and removed with a 1 and 2 mm Kerrison rongeurs. The posterior longitudinal ligament was opened along the width of the spinal canal with the 1 and 2 mm Kerrison rongeurs. Generous foraminotomies were created bilaterally. A nerve hook was passed in the epidural space, verifying the dura and nerve were free of compression. Hemostasis was obtained. Next a 14 mm [place] pin was placed in the mid-point of the C6 vertebral body and hole. Then bone marrow was aspirated from the vertebral body of C6. Hemostasis was achieved and 6 mm trial was used and verified to be a good fit for the interbody space. A 6 mm Tritanium (Edwin) lordotic cage/device was packed with DBM, locally harvested autograft and soaked in bone marrow aspirate was placed in the intervertebral space. Next my attention was turned to the C6-C7 disk space and the disk was incised with a 15 blade. The disk material was removed with pituitary rongeurs and curettes. The ventral osteophyte overlying the disc space from the C6 vertebral body was removed with Kerrison rongeurs and morselized for autograft. The cartilaginous endplates were removed with the Edwin Drill. The posterolateral osteophytes were also drilled down and removed with a 1 and 2 mm Kerrison rongeurs.  The posterior longitudinal ligament was opened along the width of the spinal canal with the 1 and 2 mm Kerrison ade. Generous foraminotomies were created bilaterally. A nerve hook was passed in the epidural space, verifying the dura and nerve were free of compression. Hemostasis was obtained. A 6 mm trial was used and verified to be a good fit for the interbody space. An 6 mm Tritanium (Edwin) lordotic cage/device packed with DBM, locally harvested autograft, and soaked in bone marrow aspirate was placed in the intervertebral space. The anterior vertebral osteophytes were then drilled down and leveled for the placement of the anterior plate. Next the [facility] anterior cervical plate was sized to fit from the vertebral bodies of C5-C7 (Measuring 38 mm) in length. Two 14 mm bone screws were then placed in each vertebra and locked to the plate with a locking mechanism. The wound was irrigated with copious anti-biotic instilled irrigation and vancomycin powder was instilled into the wound. The soft tissue retractor was removed. Hemostasis was obtained. A medium hemovac drain was placed overlying the plate and tunneled exterior through the skin. Platysma was closed with 2-0 Vicryl, the superficial subcutaneous/subdermal layer was closed with with 2-0 Vicryl interrupted sutures, and the skin was well approximated and closed with Dermabond skin glue. The patient tolerated the procedure well and there were no complications. The counts were correct at the end of the case. Disposition: PACU and from PACU to the floor      Qamar Flor, [address] [place] [address]

## 2020-10-22 NOTE — PROGRESS NOTES
TRANSFER - IN REPORT:    Verbal report received from Swetha Jamison RN on Lucrecia Hudson  being received from PACU for routine progression of care      Report consisted of patients Situation, Background, Assessment and   Recommendations(SBAR). Information from the following report(s) SBAR, Kardex, OR Summary, MAR and Recent Results was reviewed with the receiving nurse. Opportunity for questions and clarification was provided. Assessment completed upon patients arrival to unit and care assumed.

## 2020-10-22 NOTE — PERIOP NOTES
TRANSFER - OUT REPORT:    Verbal report given to Ann Mcghee RN on Anila Grout  being transferred to  for routine post - op       Report consisted of patients Situation, Background, Assessment and   Recommendations(SBAR). Information from the following report(s) SBAR, OR Summary, Procedure Summary, Intake/Output and MAR was reviewed with the receiving nurse. Lines:   Peripheral IV 10/22/20 Right Antecubital (Active)   Site Assessment Clean, dry, & intact 10/22/20 1535   Phlebitis Assessment 0 10/22/20 1535   Infiltration Assessment 0 10/22/20 1535   Dressing Status Clean, dry, & intact; Occlusive 10/22/20 1535   Dressing Type Transparent;Tape 10/22/20 1535   Hub Color/Line Status Pink;Patent 10/22/20 1535   Alcohol Cap Used No 10/22/20 1535        Opportunity for questions and clarification was provided. Patient transported with:   O2 @ 2 liters    VTE prophylaxis orders have been written for Anila Grout. Patient and family given floor number and nurses name. Family updated re: pt status after security code verified.

## 2020-10-22 NOTE — PROGRESS NOTES
Patient arrived on floor but prior to transfer onto bed, was taken to radiology for post op films. Assessment will be done upon return to floor.

## 2020-10-23 VITALS
SYSTOLIC BLOOD PRESSURE: 130 MMHG | OXYGEN SATURATION: 100 % | BODY MASS INDEX: 26.47 KG/M2 | RESPIRATION RATE: 17 BRPM | DIASTOLIC BLOOD PRESSURE: 80 MMHG | WEIGHT: 195.4 LBS | HEART RATE: 78 BPM | HEIGHT: 72 IN | TEMPERATURE: 97.9 F

## 2020-10-23 PROCEDURE — 74011250637 HC RX REV CODE- 250/637: Performed by: NEUROLOGICAL SURGERY

## 2020-10-23 PROCEDURE — 74011000250 HC RX REV CODE- 250: Performed by: NEUROLOGICAL SURGERY

## 2020-10-23 PROCEDURE — 2709999900 HC NON-CHARGEABLE SUPPLY

## 2020-10-23 PROCEDURE — 74011250636 HC RX REV CODE- 250/636: Performed by: NEUROLOGICAL SURGERY

## 2020-10-23 RX ORDER — CYCLOBENZAPRINE HCL 10 MG
10 TABLET ORAL
Qty: 30 TAB | Refills: 0 | Status: SHIPPED | OUTPATIENT
Start: 2020-10-23 | End: 2020-11-02

## 2020-10-23 RX ORDER — ACETAMINOPHEN 500 MG
500 TABLET ORAL
Status: SHIPPED | COMMUNITY
Start: 2020-10-23 | End: 2021-07-07

## 2020-10-23 RX ORDER — HYDROCODONE BITARTRATE AND ACETAMINOPHEN 10; 325 MG/1; MG/1
1 TABLET ORAL
Qty: 20 TAB | Refills: 0 | Status: SHIPPED | OUTPATIENT
Start: 2020-10-23 | End: 2020-10-28

## 2020-10-23 RX ADMIN — HYDROCODONE BITARTRATE AND ACETAMINOPHEN 1 TABLET: 10; 325 TABLET ORAL at 12:34

## 2020-10-23 RX ADMIN — Medication 10 ML: at 05:36

## 2020-10-23 RX ADMIN — Medication 1 AMPULE: at 08:43

## 2020-10-23 RX ADMIN — CEFAZOLIN SODIUM 2 G: 100 INJECTION, POWDER, LYOPHILIZED, FOR SOLUTION INTRAVENOUS at 12:34

## 2020-10-23 RX ADMIN — CEFAZOLIN SODIUM 2 G: 100 INJECTION, POWDER, LYOPHILIZED, FOR SOLUTION INTRAVENOUS at 04:24

## 2020-10-23 RX ADMIN — HYDROCODONE BITARTRATE AND ACETAMINOPHEN 1 TABLET: 10; 325 TABLET ORAL at 04:22

## 2020-10-23 NOTE — PROGRESS NOTES
Problem: Falls - Risk of  Goal: *Absence of Falls  Description: Document Josafat Pereira Fall Risk and appropriate interventions in the flowsheet.   Outcome: Progressing Towards Goal  Note: Fall Risk Interventions:  Mobility Interventions: Bed/chair exit alarm         Medication Interventions: Bed/chair exit alarm    Elimination Interventions: Call light in reach, Bed/chair exit alarm              Problem: Patient Education: Go to Patient Education Activity  Goal: Patient/Family Education  Outcome: Progressing Towards Goal     Problem: Simple Spine Procedure:  Day of Surgery  Goal: Off Pathway (Use only if patient is Off Pathway)  Outcome: Progressing Towards Goal  Goal: Activity/Safety  Outcome: Progressing Towards Goal  Goal: Consults, if ordered  Outcome: Progressing Towards Goal  Goal: Nutrition/Diet  Outcome: Progressing Towards Goal  Goal: Discharge Planning  Outcome: Progressing Towards Goal  Goal: Medications  Outcome: Progressing Towards Goal  Goal: Respiratory  Outcome: Progressing Towards Goal  Goal: Treatments/Interventions/Procedures  Outcome: Progressing Towards Goal  Goal: Psychosocial  Outcome: Progressing Towards Goal  Goal: *Verbalizes understanding of type and use of pain medication  Outcome: Progressing Towards Goal  Goal: *Optimal pain control at patient's stated goal  Outcome: Progressing Towards Goal  Goal: *Verbalizes/demonstrates understanding of proper body mechanics and use of stabilization device if ordered  Outcome: Progressing Towards Goal  Goal: *Activity level attained as ordered  Outcome: Progressing Towards Goal  Goal: *Active bowel sounds  Outcome: Progressing Towards Goal  Goal: *Respiratory status stable  Outcome: Progressing Towards Goal  Goal: *Adequate urinary output  Outcome: Progressing Towards Goal  Goal: *Hemodynamically stable  Outcome: Progressing Towards Goal     Problem: Simple Spine Procedure:  Post Op Day 1/Day of Discharge  Goal: Off Pathway (Use only if patient is Off Pathway)  Outcome: Progressing Towards Goal  Goal: Activity/Safety  Outcome: Progressing Towards Goal  Goal: Nutrition/Diet  Outcome: Progressing Towards Goal  Goal: Discharge Planning  Outcome: Progressing Towards Goal  Goal: Medications  Outcome: Progressing Towards Goal  Goal: Respiratory  Outcome: Progressing Towards Goal  Goal: Treatments/Interventions/Procedures  Outcome: Progressing Towards Goal  Goal: Psychosocial  Outcome: Progressing Towards Goal     Problem: Simple Spine Procedure: Discharge Outcomes  Goal: *Optimal pain control at patient's stated goal  Outcome: Progressing Towards Goal  Goal: *Demonstrates ability to place and remove stabilization device  Outcome: Progressing Towards Goal  Goal: *Progress independence mobility/activities (eg: Mobility precautions)  Outcome: Progressing Towards Goal  Goal: *Resumes normal function of bladder and bowel  Outcome: Progressing Towards Goal  Goal: *Lungs clear or at baseline  Outcome: Progressing Towards Goal  Goal: *Verbalizes name, dosage, time, side effects, and number of days to continue medications  Outcome: Progressing Towards Goal  Goal: *Modified independence with transfers, ambulation on levels with assistance devices, stair climbing, ADL's  Outcome: Progressing Towards Goal  Goal: *Describes follow-up/return visits to physicians  Outcome: Progressing Towards Goal  Goal: *Describes available resources and support systems  Outcome: Progressing Towards Goal  Goal: *Labs within defined limits  Outcome: Progressing Towards Goal  Goal: *Tolerating diet  Outcome: Progressing Towards Goal

## 2020-10-23 NOTE — PROGRESS NOTES
Discharge instructions provided to patient and spouse. Opportunity for questions offered and all questions answered. IV and hemovac removed. Patient transported to [facility] via [facility] by staff.

## 2020-10-23 NOTE — PROGRESS NOTES
CM reviewed chart for potential discharge needs. No therapy or CM consult noted. Please consult CM for any needs at discharge.     Discharge Location  Discharge Placement: Unable to determine at this time

## 2020-10-23 NOTE — DISCHARGE SUMMARY
NEUROSURGERY DISCHARGE SUMMARY     Patient Name: Mauricio Lim  Patient MRN: [id number]  Date of Admission: 10/22/2020  Date of Discharge: 10/23/2020   Length of Hospital Stay: 1    Pre-Procedure Diagnosis:   1. Degenerative disc disease C5-C6 and C6-C7  2. Cervical Spinal stenosis C5-C6 and C6-C7   3. Cervical Radiculopathy   4. Neuroforaminal stenosis cervical spine   5. Disc-osteophyte complex   Post-Procedure Diagnosis: SAME AS ABOVE     Admitting Attending: Michael Maloney. Lyla Brown MD  Discharge Attending: SAME AS ABOVE    Past Medical History:   Diagnosis Date    Arthritis     cervical and knees    BMI 25.0-25.9,adult     BMI 25.1    Chronic pain     neck     Past Surgical History:   Procedure Laterality Date    HX KNEE ARTHROSCOPY Right 1987 and 1993     No Known Allergies    Procedures Performed During Hospitalization:   Performed on 10/23/2020  1  C5-C6 and C6-C7  COMPLETE ANTERIOR CERVICAL DISKECTOMY, OSTEOPHYTECTOMY FOR NERVE ROOT AND SPINAL CORD DECOMPRESSION  2. C5-C6 and C6-C7 INTERBODY FIXATION WITH KISHA TRITANIUM LORDOTIC DEVICE/GRAFTS, AUTOGRAFT, and DBM  3. ANTERIOR PLATE AND SCREW FIXATION FROM C5,C6, and C7 K-2 OZARK SYSTEM   4. BONE MARROW ASPIRATION   5. INTRAOPERATIVE MICROSCOPE (MICROSURGICAL TECHNIQUE  6. INTRAOPERATIVE FLUOROSCOPY       Summary of Hospital Events:   Mauricio Lim is a 63 y.o. male who was admitted to surgical floor in the hospital for routine post-operative convalescence. There he progressed as expected in the post-operative setting without complications. On the day of discharge his pain was well controlled, he was tolerated oral foods he was amenable to being discharged home.      Physical Exam:   Visit Vitals  /75 (BP 1 Location: Left arm, BP Patient Position: At rest)   Pulse 90   Temp 98.4 °F (36.9 °C)   Resp 18   Ht 5' 11.5\" (1.816 m)   Wt 195 lb 6.4 oz (88.6 kg)   SpO2 97%   BMI 26.87 kg/m²     Patient is in no acute distress  Patient is awake, alert, and oriented. Patient skin is warm and dry  No mid-line cervical, thoracic, or lumbar tenderness to palpation   Patient has 5/5 strength in the all muscle groups    Incision: right anterior transverse incision is clean, dry, and intact with Dermabond on the skin     Follow-Up: Wound check with Nurse Tree Campos at [address] [place] [address] and [facility]    Current Discharge Medication List      START taking these medications    Details   cyclobenzaprine (FLEXERIL) 10 mg tablet Take 1 Tab by mouth three (3) times daily as needed for Muscle Spasm(s) for up to 10 days. Qty: 30 Tab, Refills: 0    Associated Diagnoses: Degenerative disc disease, cervical; Cervical spinal stenosis; Cervical radiculopathy; Neuroforaminal stenosis of cervical spine; Disc-osteophyte complex      HYDROcodone-acetaminophen (NORCO)  mg tablet Take 1 Tab by mouth every six (6) hours as needed (Moderate to severe pain) for up to 5 days. Max Daily Amount: 4 Tabs. Qty: 20 Tab, Refills: 0    Associated Diagnoses: Degenerative disc disease, cervical; Cervical spinal stenosis; Cervical radiculopathy; Neuroforaminal stenosis of cervical spine; Disc-osteophyte complex         CONTINUE these medications which have CHANGED    Details   acetaminophen (Tylenol Extra Strength) 500 mg tablet Take 1 Tab by mouth every six (6) hours as needed for Pain (Mild pain). CONTINUE these medications which have NOT CHANGED    Details   TURMERIC PO Take  by mouth daily. gabapentin (NEURONTIN) 100 mg capsule Take 300 mg by mouth three (3) times daily as needed. ibuprofen (AdviL) 200 mg tablet Take 400 mg by mouth as needed for Pain. Hold for 5 days prior to surgery             The patient has been advised to call or seek medical attention should they develop any new weakness, numbness, tingling, fevers, drainage from incision, redness of the wound, or any other concerns. Deshawn Flores.  [facility] and [facility]

## 2020-10-23 NOTE — DISCHARGE INSTRUCTIONS
Isabel Butts                        Cervical (Neck) Fusion Postoperative Home Instructions    - SHOWERING: You may shower the first day you are home with the dressing on. Do not soak in a tub for at least three weeks. If your dressing gets wet, change it according to the written instructions below. Use only soap and water on the incision and pat it dry. Do not scrub the incision.    - WOUND CARE: A small to moderate amount of reddish drainage on the dressing is normal the first 1-2 days after surgery. If your dressing becomes soaked with drainage, let your doctor know. [facility] HANDS BEFORE AND AFTER INCISION CARE.    - SIGNS OF INFECTION: Please notify your physician for any of the following: a temperature greater than 100.5, extreme tenderness at the wound, excessive redness and/or swelling, or large amounts of drainage from the wound. If you think you have a wound infection, call your physician's office immediately.    - SWALLOWING: Don't be alarmed if it seems there is a lump in your throat for several days after surgery- this is normal. Eat only soft foods and drink plenty of fluids until your throat feels better. If you begin having trouble swallowing, call the office immediately. - DRIVING: You may not begin driving until after your visit to the physician's office for a wound and suture check. This is normally 7-10 days after you come home from the hospital.    - MEDICATIONS: You may not take anti-inflammatory medications. These include over-the-counter ibuprofen products such as Motrin, Advil, Aleve and other related medications or prescription medications such as Ultram, Naproxen, Indocin, or Celebrex and others. These medications slow down the bone healing. You may take Tylenol. The pain medicine prescribed may be taken as needed. You should continue taking a stool softener twice a day, drink plenty of water and eat high fiber foods to avoid constipation.  (This is a common problem patients experience while taking pain medicine). - DEEP BREATHING EXERCISES: Continue to use your incentive spirometer for deep breathing exercises. - SMOKING: Smoking will interfere with your healing. If you smoke, you may end up having another surgery or more problems.    - ACTIVITY: Avoid reaching overhead, particularly to lift things, until you have been told that your fusion has healed. Do not lift objects heavier than a coffee cup or a newspaper. You shouldn't lift anything heavier than 2-5 pounds. When lifting, you should bend with your knees and keep your back straight. Sleeping may be difficult and sometimes requires you to change positions frequently; try to sleep with your head elevated 15-30 degrees. You may turn your head  Gradually from side to side, but avoid placing your chin to your chest or flexing your head backwards. You may remove your MATILDA hose when consistently walking. You may do steps and inclines in moderation.    - CERVICAL COLLAR: If your physician instructs you to wear one, wear your collar when riding in a car or doing activities outside of your home. Make sure the collar supports your chin and remains snug at all times. Your physician may give you other instructions as well. - SEXUAL RELATIONS: You may resume sexual relations 2 weeks after your surgery. - WALKING PROGRAM: After your sutures are removed or dissolved, it is very important that you begin a progressive walking program. Walking strengthens the spinal muscles and will help protect your disc and vertebrae. You will need to increase your walking program up to two miles per day over the next four weeks. You should begin slowly with 1/8 to 1/4 of a mile and add to this each day. Please be very consistent with your walking program, as this is a vital part of your recovery.    - SYMPTOMS AFTER SURGERY: Don't be alarmed if you still have some of the same symptoms you had prior to surgery.  The nerves often require time to heal after the pressure has been relieved. You may also experience pain between your shoulder blades which is common after this surgery. The level of pain you experience should improve as your body heals. - FOLLOW-UP: An appointment will be made for you to follow-up with your surgeon or the office nurse. Please bring any X-rays of your neck to the appointmet. Please call your physician's office if you have any other questions or problems. Listen to your body, it will tell you if you are overdoing it. Use common sense and take care of yourself!

## 2020-11-25 ENCOUNTER — HOSPITAL ENCOUNTER (OUTPATIENT)
Dept: GENERAL RADIOLOGY | Age: 55
Discharge: HOME | End: 2020-11-25
Payer: COMMERCIAL

## 2020-11-25 DIAGNOSIS — M50.30 DEGENERATIVE DISC DISEASE, CERVICAL: ICD-10-CM

## 2020-11-25 DIAGNOSIS — M54.12 CERVICAL RADICULOPATHY: ICD-10-CM

## 2020-11-25 PROCEDURE — 72040 X-RAY EXAM NECK SPINE 2-3 VW: CPT

## 2021-01-20 ENCOUNTER — HOSPITAL ENCOUNTER (OUTPATIENT)
Dept: GENERAL RADIOLOGY | Age: 56
Discharge: HOME | End: 2021-01-20
Payer: COMMERCIAL

## 2021-01-20 DIAGNOSIS — Z98.890 STATUS POST CERVICAL DISCECTOMY: ICD-10-CM

## 2021-01-20 DIAGNOSIS — Z98.1 STATUS POST CERVICAL SPINAL FUSION: ICD-10-CM

## 2021-01-20 PROCEDURE — 72040 X-RAY EXAM NECK SPINE 2-3 VW: CPT

## 2021-04-21 ENCOUNTER — HOSPITAL ENCOUNTER (OUTPATIENT)
Dept: GENERAL RADIOLOGY | Age: 56
Discharge: HOME | End: 2021-04-21
Payer: COMMERCIAL

## 2021-04-21 DIAGNOSIS — Z98.1 STATUS POST CERVICAL SPINAL FUSION: ICD-10-CM

## 2021-04-21 DIAGNOSIS — Z98.890 STATUS POST CERVICAL DISCECTOMY: ICD-10-CM

## 2021-04-21 PROCEDURE — 72040 X-RAY EXAM NECK SPINE 2-3 VW: CPT

## 2021-07-07 ENCOUNTER — HOSPITAL ENCOUNTER (OUTPATIENT)
Dept: GENERAL RADIOLOGY | Age: 56
Discharge: HOME | End: 2021-07-07
Payer: COMMERCIAL

## 2021-07-07 DIAGNOSIS — Z98.890 STATUS POST CERVICAL DISCECTOMY: ICD-10-CM

## 2021-07-07 DIAGNOSIS — Z98.1 STATUS POST CERVICAL SPINAL FUSION: ICD-10-CM

## 2021-07-07 DIAGNOSIS — M48.02 CERVICAL SPINAL STENOSIS: ICD-10-CM

## 2021-07-07 DIAGNOSIS — M54.12 CERVICAL RADICULOPATHY: ICD-10-CM

## 2021-07-07 PROCEDURE — 72040 X-RAY EXAM NECK SPINE 2-3 VW: CPT

## 2021-08-31 PROBLEM — R20.0 NUMBNESS AND TINGLING IN RIGHT HAND: Status: ACTIVE | Noted: 2021-08-31

## 2021-08-31 PROBLEM — R20.2 NUMBNESS AND TINGLING IN RIGHT HAND: Status: ACTIVE | Noted: 2021-08-31

## 2021-08-31 PROBLEM — G56.01 CARPAL TUNNEL SYNDROME OF RIGHT WRIST: Status: ACTIVE | Noted: 2021-08-31

## 2021-10-20 ENCOUNTER — HOSPITAL ENCOUNTER (OUTPATIENT)
Dept: GENERAL RADIOLOGY | Age: 56
Discharge: HOME | End: 2021-10-20
Payer: COMMERCIAL

## 2021-10-20 DIAGNOSIS — Z98.890 STATUS POST CERVICAL DISCECTOMY: ICD-10-CM

## 2021-10-20 DIAGNOSIS — Z98.1 STATUS POST CERVICAL SPINAL FUSION: ICD-10-CM

## 2021-10-20 PROCEDURE — 72040 X-RAY EXAM NECK SPINE 2-3 VW: CPT

## 2022-01-03 NOTE — PROGRESS NOTES
Problem: Patient Education: Go to Patient Education Activity  Goal: Patient/Family Education  Outcome: Progressing Towards Goal     Problem: Simple Spine Procedure:  Day of Surgery  Goal: Activity/Safety  Outcome: Progressing Towards Goal  Goal: Consults, if ordered  Outcome: Progressing Towards Goal  Goal: Nutrition/Diet  Outcome: Progressing Towards Goal  Goal: Discharge Planning  Outcome: Progressing Towards Goal  Goal: Medications  Outcome: Progressing Towards Goal  Goal: Respiratory  Outcome: Progressing Towards Goal  Goal: Treatments/Interventions/Procedures  Outcome: Progressing Towards Goal  Goal: Psychosocial  Outcome: Progressing Towards Goal  Goal: *Verbalizes understanding of type and use of pain medication  Outcome: Progressing Towards Goal  Goal: *Optimal pain control at patient's stated goal  Outcome: Progressing Towards Goal  Goal: *Verbalizes/demonstrates understanding of proper body mechanics and use of stabilization device if ordered  Outcome: Progressing Towards Goal  Goal: *Activity level attained as ordered  Outcome: Progressing Towards Goal  Goal: *Active bowel sounds  Outcome: Progressing Towards Goal  Goal: *Respiratory status stable  Outcome: Progressing Towards Goal  Goal: *Hemodynamically stable  Outcome: Progressing Towards Goal No

## 2022-03-18 PROBLEM — M54.12 CERVICAL RADICULOPATHY: Status: ACTIVE | Noted: 2020-10-22

## 2022-03-18 PROBLEM — M50.30 DISC-OSTEOPHYTE COMPLEX: Status: ACTIVE | Noted: 2020-10-22

## 2022-03-18 PROBLEM — M25.78 DISC-OSTEOPHYTE COMPLEX: Status: ACTIVE | Noted: 2020-10-22

## 2022-03-19 PROBLEM — M48.02 CERVICAL SPINAL STENOSIS: Status: ACTIVE | Noted: 2020-10-22

## 2022-03-19 PROBLEM — R20.0 NUMBNESS AND TINGLING IN RIGHT HAND: Status: ACTIVE | Noted: 2021-08-31

## 2022-03-19 PROBLEM — R20.2 NUMBNESS AND TINGLING IN RIGHT HAND: Status: ACTIVE | Noted: 2021-08-31

## 2022-03-19 PROBLEM — M48.02 NEUROFORAMINAL STENOSIS OF CERVICAL SPINE: Status: ACTIVE | Noted: 2020-10-22

## 2022-03-19 PROBLEM — G56.01 CARPAL TUNNEL SYNDROME OF RIGHT WRIST: Status: ACTIVE | Noted: 2021-08-31

## 2022-03-20 PROBLEM — M50.30 DEGENERATIVE DISC DISEASE, CERVICAL: Status: ACTIVE | Noted: 2020-10-22

## 2023-01-26 ENCOUNTER — OFFICE VISIT (OUTPATIENT)
Dept: NEUROSURGERY | Age: 58
End: 2023-01-26
Payer: COMMERCIAL

## 2023-01-26 ENCOUNTER — TELEPHONE (OUTPATIENT)
Dept: NEUROSURGERY | Age: 58
End: 2023-01-26

## 2023-01-26 VITALS
HEIGHT: 72 IN | DIASTOLIC BLOOD PRESSURE: 82 MMHG | SYSTOLIC BLOOD PRESSURE: 124 MMHG | HEART RATE: 79 BPM | TEMPERATURE: 97.7 F | WEIGHT: 181 LBS | BODY MASS INDEX: 24.52 KG/M2 | OXYGEN SATURATION: 96 %

## 2023-01-26 DIAGNOSIS — G56.02 LEFT CARPAL TUNNEL SYNDROME: ICD-10-CM

## 2023-01-26 DIAGNOSIS — G56.01 CARPAL TUNNEL SYNDROME, RIGHT UPPER LIMB: Primary | ICD-10-CM

## 2023-01-26 PROCEDURE — 99214 OFFICE O/P EST MOD 30 MIN: CPT | Performed by: NEUROLOGICAL SURGERY

## 2023-01-26 RX ORDER — HYDROCODONE BITARTRATE AND ACETAMINOPHEN 7.5; 325 MG/1; MG/1
1 TABLET ORAL EVERY 6 HOURS PRN
Qty: 21 TABLET | Refills: 0 | Status: SHIPPED | OUTPATIENT
Start: 2023-01-26 | End: 2023-02-02

## 2023-01-26 RX ORDER — SODIUM CHLORIDE 0.9 % (FLUSH) 0.9 %
5-40 SYRINGE (ML) INJECTION EVERY 12 HOURS SCHEDULED
OUTPATIENT
Start: 2023-01-26

## 2023-01-26 RX ORDER — SODIUM CHLORIDE 9 MG/ML
INJECTION, SOLUTION INTRAVENOUS PRN
OUTPATIENT
Start: 2023-01-26

## 2023-01-26 RX ORDER — SODIUM CHLORIDE 0.9 % (FLUSH) 0.9 %
5-40 SYRINGE (ML) INJECTION PRN
OUTPATIENT
Start: 2023-01-26

## 2023-01-26 RX ORDER — GABAPENTIN 100 MG/1
100 CAPSULE ORAL 3 TIMES DAILY
COMMUNITY

## 2023-01-26 NOTE — PROGRESS NOTES
History of Present Illness    Patient Words: 60 y.o. This patient is a 60 y.o. male who presents today for evaluation of known bilateral carpal tunnel syndrome right greater than left with worsening symptoms over the past 6 months. Patient has been using wrist splints but they continue to be marginally effective with persistent pain requiring icing his hand at night. The pain is so bad in the right upper extremity that he travels from distal to proximal to the right shoulder. He does describe numbness and tingling in the fingertips but denies any  weakness dropping objects or fine motor control issues. EMG testing in the past confirmed severe grade 4 right carpal tunnel syndrome 2 years ago. It obviously has worsened. He has left carpal tunnel syndrome clinically as well but the right is the worst in terms of dysfunction for him. Past Medical History:   Diagnosis Date    Arthritis     cervical and knees    BMI 25.0-25.9,adult     BMI 25.1    Chronic pain     neck        Allergies   Allergen Reactions    Povidone-Iodine Other (See Comments)        Family History   Problem Relation Age of Onset    COPD Father     Heart Attack Father     Heart Attack Brother     Diabetes Brother     Heart Disease Father     Breast Cancer Sister     No Known Problems Sister     Heart Disease Brother     Diabetes Father 48    No Known Problems Mother         Social History     Socioeconomic History    Marital status:      Spouse name: Not on file    Number of children: Not on file    Years of education: Not on file    Highest education level: Not on file   Occupational History    Not on file   Tobacco Use    Smoking status: Never    Smokeless tobacco: Never   Vaping Use    Vaping Use: Never used   Substance and Sexual Activity    Alcohol use:  Yes     Alcohol/week: 0.0 - 1.0 standard drinks    Drug use: No    Sexual activity: Not on file   Other Topics Concern    Not on file   Social History Narrative    Not on file Social Determinants of Health     Financial Resource Strain: Not on file   Food Insecurity: Not on file   Transportation Needs: Not on file   Physical Activity: Not on file   Stress: Not on file   Social Connections: Not on file   Intimate Partner Violence: Not on file   Housing Stability: Not on file       Current Outpatient Medications   Medication Sig Dispense Refill    gabapentin (NEURONTIN) 100 MG capsule Take 100 mg by mouth 3 times daily. HYDROcodone-acetaminophen (NORCO) 7.5-325 MG per tablet Take 1 tablet by mouth every 6 hours as needed for Pain for up to 7 days. Intended supply: 3 days. Take lowest dose possible to manage pain Max Daily Amount: 4 tablets 21 tablet 0    ibuprofen (ADVIL;MOTRIN) 200 MG tablet Take 400 mg by mouth as needed       No current facility-administered medications for this visit. Patient Active Problem List   Diagnosis    Cervical radiculopathy    Disc-osteophyte complex    Numbness and tingling in right hand    Cervical spinal stenosis    Neuroforaminal stenosis of cervical spine    Carpal tunnel syndrome, right upper limb    Degenerative disc disease, cervical    Left carpal tunnel syndrome        Review of Systems: A complete ROS was done and as stated in the HPI or otherwise negative. /82   Pulse 79   Temp 97.7 °F (36.5 °C) (Temporal)   Ht 6' (1.829 m)   Wt 181 lb (82.1 kg)   SpO2 96%   BMI 24.55 kg/m²        Physical Exam  The physical exam findings are as follows:Physical Exam:   General:  Alert, cooperative, no distress, appears stated age. Eyes:  Conjunctivae/corneas clear. PERRL, EOMs intact. Fundi benign   Ears:  Normal TMs and external ear canals both ears. Nose: Nares normal. Septum midline. Mucosa normal. No drainage or sinus tenderness.    Mouth/Throat: Lips, mucosa, and tongue normal. Teeth and gums normal.   Neck: Supple, symmetrical, trachea midline, no adenopathy, thyroid: no enlargment/tenderness/nodules, no carotid bruit and no JVD.   Back:   Symmetric, no curvature. ROM normal. No CVA tenderness. Lungs:   Clear to auscultation bilaterally. Heart:  Regular rate and rhythm, S1, S2 normal, no murmur, click, rub or gallop. Abdomen:   Soft, non-tender. Bowel sounds normal. No masses,  No organomegaly. Extremities: Extremities normal, atraumatic, no cyanosis or edema. Pulses: 2+ and symmetric all extremities. Skin: Skin color, texture, turgor normal. No rashes or lesions   Lymph nodes: Cervical, supraclavicular, and axillary nodes normal.   Appearance: The patient is well developed, well nourished, provides a coherent history and is in no acute distress. Cranial Nerves:   Intact visual fields. Fundi are benign. FERNANDO, EOM's full, no nystagmus, no ptosis. Facial sensation is normal. Corneal reflexes are intact. Facial movement is symmetric. Hearing is normal bilaterally. Palate is midline with normal sternocleidomastoid and trapezius muscles are normal. Tongue is midline. Motor:  5/5 strength in upper and lower proximal and distal muscles. Normal bulk and tone. No fasciculations. Reflexes:   Deep tendon reflexes 2+/4 and symmetrical.   Sensory:   Normal to touch, pinprick and vibration. Gait:  Normal gait. Tremor:   No tremor noted. Cerebellar:  No cerebellar signs present. Positive Phalen's test bilaterally well          Assessment & Plan      ICD-10-CM    1. Carpal tunnel syndrome, right upper limb  G56.01 HYDROcodone-acetaminophen (NORCO) 7.5-325 MG per tablet      2. Left carpal tunnel syndrome  G56.02          Right carpal tunnel release. Carpel Tunnel Release Consent: Discussion of the options, risks, benefits and possible complications including infection, bleeding, impotence, incontinence, damage to contiguous organs, need for transfusion, need for further procedures, possible surgical or anesthetic complications, including death. Understands and wishes to proceed.      Garrick Cortez MD

## 2023-01-27 ENCOUNTER — CLINICAL DOCUMENTATION (OUTPATIENT)
Dept: NEUROSURGERY | Age: 58
End: 2023-01-27

## 2023-01-30 ENCOUNTER — PREP FOR PROCEDURE (OUTPATIENT)
Dept: NEUROSURGERY | Age: 58
End: 2023-01-30

## 2023-01-30 NOTE — PERIOP NOTE
Patient verified name and . Order for consent  found in EHR and matches case posting; patient verifies procedure. Type 1A surgery, Phone assessment complete. Orders  received. Labs per surgeon: none  Labs per anesthesia protocol: none    Patient answered medical/surgical history questions at their best of ability. All prior to admission medications documented in [facility]. Patient instructed to take the following medications the day of surgery according to anesthesia guidelines with a small sip of water: Norco or Neurontin as instructed if needed. Hold all vitamins 7 days prior to surgery and NSAIDS 5 days prior to surgery. Prescription meds to hold:Multivitamin, Zinc, and Ibuprofen. Patient instructed on the following:    > Arrive at [facility], time of arrival to be called the day before by 1700  > NPO after midnight, unless otherwise indicated, including gum, mints, and ice chips  > Responsible adult must drive patient to the hospital, stay during surgery, and patient will need supervision 24 hours after anesthesia  > Use antibacterial Soap in shower the night before surgery and on the morning of surgery  > All piercings must be removed prior to arrival.    > Leave all valuables (money and jewelry) at home but bring insurance card and ID on DOS.   > You may be required to pay a deductible or co-pay on the day of your procedure. You can pre-pay by calling [phone] if your surgery is at the [facility] or [phone] if your surgery is at the [facility]. > Do not wear make-up, nail polish, lotions, cologne, perfumes, powders, or oil on skin. Artificial nails are not permitted.

## 2023-02-09 ENCOUNTER — ANESTHESIA EVENT (OUTPATIENT)
Dept: SURGERY | Age: 58
End: 2023-02-09
Payer: COMMERCIAL

## 2023-02-10 ENCOUNTER — ANESTHESIA (OUTPATIENT)
Dept: SURGERY | Age: 58
End: 2023-02-10
Payer: COMMERCIAL

## 2023-02-10 ENCOUNTER — HOSPITAL ENCOUNTER (OUTPATIENT)
Age: 58
Setting detail: OUTPATIENT SURGERY
Discharge: HOME | End: 2023-02-10
Attending: NEUROLOGICAL SURGERY | Admitting: NEUROLOGICAL SURGERY
Payer: COMMERCIAL

## 2023-02-10 VITALS
OXYGEN SATURATION: 100 % | WEIGHT: 180 LBS | RESPIRATION RATE: 18 BRPM | DIASTOLIC BLOOD PRESSURE: 73 MMHG | SYSTOLIC BLOOD PRESSURE: 118 MMHG | HEIGHT: 72 IN | BODY MASS INDEX: 24.38 KG/M2 | TEMPERATURE: 98 F | HEART RATE: 59 BPM

## 2023-02-10 DIAGNOSIS — G56.01 CARPAL TUNNEL SYNDROME, RIGHT UPPER LIMB: Primary | ICD-10-CM

## 2023-02-10 PROCEDURE — 64721 CARPAL TUNNEL SURGERY: CPT | Performed by: NEUROLOGICAL SURGERY

## 2023-02-10 PROCEDURE — 7100000011 HC PHASE II RECOVERY - ADDTL 15 MIN: Performed by: NEUROLOGICAL SURGERY

## 2023-02-10 PROCEDURE — 7100000000 HC PACU RECOVERY - FIRST 15 MIN: Performed by: NEUROLOGICAL SURGERY

## 2023-02-10 PROCEDURE — 6360000002 HC RX W HCPCS: Performed by: NURSE ANESTHETIST, CERTIFIED REGISTERED

## 2023-02-10 PROCEDURE — 3700000001 HC ADD 15 MINUTES (ANESTHESIA): Performed by: NEUROLOGICAL SURGERY

## 2023-02-10 PROCEDURE — 3600000012 HC SURGERY LEVEL 2 ADDTL 15MIN: Performed by: NEUROLOGICAL SURGERY

## 2023-02-10 PROCEDURE — 2709999900 HC NON-CHARGEABLE SUPPLY: Performed by: NEUROLOGICAL SURGERY

## 2023-02-10 PROCEDURE — 7100000001 HC PACU RECOVERY - ADDTL 15 MIN: Performed by: NEUROLOGICAL SURGERY

## 2023-02-10 PROCEDURE — 3600000002 HC SURGERY LEVEL 2 BASE: Performed by: NEUROLOGICAL SURGERY

## 2023-02-10 PROCEDURE — 2580000003 HC RX 258: Performed by: ANESTHESIOLOGY

## 2023-02-10 PROCEDURE — 7100000010 HC PHASE II RECOVERY - FIRST 15 MIN: Performed by: NEUROLOGICAL SURGERY

## 2023-02-10 PROCEDURE — 2500000003 HC RX 250 WO HCPCS: Performed by: NURSE ANESTHETIST, CERTIFIED REGISTERED

## 2023-02-10 PROCEDURE — 3700000000 HC ANESTHESIA ATTENDED CARE: Performed by: NEUROLOGICAL SURGERY

## 2023-02-10 PROCEDURE — 6360000002 HC RX W HCPCS: Performed by: NEUROLOGICAL SURGERY

## 2023-02-10 RX ORDER — OXYCODONE HYDROCHLORIDE 5 MG/1
10 TABLET ORAL PRN
Status: DISCONTINUED | OUTPATIENT
Start: 2023-02-10 | End: 2023-02-10 | Stop reason: HOSPADM

## 2023-02-10 RX ORDER — SODIUM CHLORIDE 0.9 % (FLUSH) 0.9 %
5-40 SYRINGE (ML) INJECTION EVERY 12 HOURS SCHEDULED
Status: DISCONTINUED | OUTPATIENT
Start: 2023-02-10 | End: 2023-02-10 | Stop reason: HOSPADM

## 2023-02-10 RX ORDER — ACETAMINOPHEN 500 MG
1000 TABLET ORAL ONCE
Status: DISCONTINUED | OUTPATIENT
Start: 2023-02-10 | End: 2023-02-10 | Stop reason: HOSPADM

## 2023-02-10 RX ORDER — SODIUM CHLORIDE 9 MG/ML
INJECTION, SOLUTION INTRAVENOUS PRN
Status: DISCONTINUED | OUTPATIENT
Start: 2023-02-10 | End: 2023-02-10 | Stop reason: SDUPTHER

## 2023-02-10 RX ORDER — SODIUM CHLORIDE 0.9 % (FLUSH) 0.9 %
5-40 SYRINGE (ML) INJECTION PRN
Status: DISCONTINUED | OUTPATIENT
Start: 2023-02-10 | End: 2023-02-10 | Stop reason: HOSPADM

## 2023-02-10 RX ORDER — ACETAMINOPHEN 500 MG
1300 TABLET ORAL EVERY 6 HOURS PRN
COMMUNITY

## 2023-02-10 RX ORDER — SODIUM CHLORIDE 9 MG/ML
INJECTION, SOLUTION INTRAVENOUS PRN
Status: DISCONTINUED | OUTPATIENT
Start: 2023-02-10 | End: 2023-02-10 | Stop reason: HOSPADM

## 2023-02-10 RX ORDER — DIPHENHYDRAMINE HYDROCHLORIDE 50 MG/ML
12.5 INJECTION INTRAMUSCULAR; INTRAVENOUS
Status: DISCONTINUED | OUTPATIENT
Start: 2023-02-10 | End: 2023-02-10 | Stop reason: HOSPADM

## 2023-02-10 RX ORDER — SODIUM CHLORIDE, SODIUM LACTATE, POTASSIUM CHLORIDE, CALCIUM CHLORIDE 600; 310; 30; 20 MG/100ML; MG/100ML; MG/100ML; MG/100ML
INJECTION, SOLUTION INTRAVENOUS CONTINUOUS
Status: DISCONTINUED | OUTPATIENT
Start: 2023-02-10 | End: 2023-02-10 | Stop reason: HOSPADM

## 2023-02-10 RX ORDER — HYDROMORPHONE HCL 110MG/55ML
0.25 PATIENT CONTROLLED ANALGESIA SYRINGE INTRAVENOUS EVERY 5 MIN PRN
Status: DISCONTINUED | OUTPATIENT
Start: 2023-02-10 | End: 2023-02-10 | Stop reason: HOSPADM

## 2023-02-10 RX ORDER — MIDAZOLAM HYDROCHLORIDE 2 MG/2ML
2 INJECTION, SOLUTION INTRAMUSCULAR; INTRAVENOUS
Status: DISCONTINUED | OUTPATIENT
Start: 2023-02-10 | End: 2023-02-10 | Stop reason: HOSPADM

## 2023-02-10 RX ORDER — PROPOFOL 10 MG/ML
INJECTION, EMULSION INTRAVENOUS PRN
Status: DISCONTINUED | OUTPATIENT
Start: 2023-02-10 | End: 2023-02-10 | Stop reason: SDUPTHER

## 2023-02-10 RX ORDER — ONDANSETRON 2 MG/ML
4 INJECTION INTRAMUSCULAR; INTRAVENOUS
Status: DISCONTINUED | OUTPATIENT
Start: 2023-02-10 | End: 2023-02-10 | Stop reason: HOSPADM

## 2023-02-10 RX ORDER — HYDROMORPHONE HCL 110MG/55ML
0.5 PATIENT CONTROLLED ANALGESIA SYRINGE INTRAVENOUS EVERY 10 MIN PRN
Status: DISCONTINUED | OUTPATIENT
Start: 2023-02-10 | End: 2023-02-10 | Stop reason: HOSPADM

## 2023-02-10 RX ORDER — OXYCODONE HYDROCHLORIDE 5 MG/1
5 TABLET ORAL PRN
Status: DISCONTINUED | OUTPATIENT
Start: 2023-02-10 | End: 2023-02-10 | Stop reason: HOSPADM

## 2023-02-10 RX ORDER — OXYCODONE AND ACETAMINOPHEN 7.5; 325 MG/1; MG/1
1 TABLET ORAL EVERY 6 HOURS PRN
Qty: 20 TABLET | Refills: 0 | Status: SHIPPED | OUTPATIENT
Start: 2023-02-10 | End: 2023-02-15

## 2023-02-10 RX ORDER — FENTANYL CITRATE 50 UG/ML
100 INJECTION, SOLUTION INTRAMUSCULAR; INTRAVENOUS
Status: DISCONTINUED | OUTPATIENT
Start: 2023-02-10 | End: 2023-02-10 | Stop reason: HOSPADM

## 2023-02-10 RX ORDER — LIDOCAINE HYDROCHLORIDE 20 MG/ML
INJECTION, SOLUTION EPIDURAL; INFILTRATION; INTRACAUDAL; PERINEURAL PRN
Status: DISCONTINUED | OUTPATIENT
Start: 2023-02-10 | End: 2023-02-10 | Stop reason: SDUPTHER

## 2023-02-10 RX ADMIN — Medication 2000 MG: at 12:02

## 2023-02-10 RX ADMIN — PROPOFOL 200 MCG/KG/MIN: 10 INJECTION, EMULSION INTRAVENOUS at 12:05

## 2023-02-10 RX ADMIN — PROPOFOL 60 MG: 10 INJECTION, EMULSION INTRAVENOUS at 12:04

## 2023-02-10 RX ADMIN — SODIUM CHLORIDE, POTASSIUM CHLORIDE, SODIUM LACTATE AND CALCIUM CHLORIDE: 600; 310; 30; 20 INJECTION, SOLUTION INTRAVENOUS at 10:10

## 2023-02-10 RX ADMIN — PROPOFOL 60 MG: 10 INJECTION, EMULSION INTRAVENOUS at 12:12

## 2023-02-10 RX ADMIN — LIDOCAINE HYDROCHLORIDE 100 MG: 20 INJECTION, SOLUTION EPIDURAL; INFILTRATION; INTRACAUDAL; PERINEURAL at 12:04

## 2023-02-10 ASSESSMENT — PAIN - FUNCTIONAL ASSESSMENT
PAIN_FUNCTIONAL_ASSESSMENT: NONE - DENIES PAIN

## 2023-02-10 NOTE — ANESTHESIA POSTPROCEDURE EVALUATION
Department of Anesthesiology  Postprocedure Note    Patient: Emma Reilly  MRN: [id number]  YOB: 1965  Date of evaluation: 2/10/2023      Procedure Summary     Date: 02/10/23 Room / Location: [facility] MAIN OR  / [facility] MAIN OR    Anesthesia Start: 1153 Anesthesia Stop: 1302    Procedure: CARPAL TUNNEL RELEASE (Right: Wrist) Diagnosis:       Carpal tunnel syndrome, right      (Carpal tunnel syndrome, right [G56.01])    Providers: Milana Ballard MD Responsible Provider: Dianne Baca MD    Anesthesia Type: TIVA ASA Status: 2          Anesthesia Type: No value filed.     Rosita Phase I: Rosita Score: 8    Rosita Phase II:        Anesthesia Post Evaluation    Patient location during evaluation: PACU  Patient participation: complete - patient participated  Level of consciousness: awake and alert  Airway patency: patent  Nausea & Vomiting: no nausea  Cardiovascular status: hemodynamically stable  Respiratory status: acceptable  Hydration status: euvolemic

## 2023-02-10 NOTE — PROGRESS NOTES
Spiritual Care visit. Initial Visit, Pre Surgery Consult. Visit and prayer before patient goes to surgery.     Visit by Nan Cheney M.Ed., Th.B. ,Staff

## 2023-02-10 NOTE — ANESTHESIA PRE PROCEDURE
Department of Anesthesiology  Preprocedure Note       Name:  Darlin Goetz   Age:  60 y.o.  :  1965                                          MRN:  [id number]         Date:  2/10/2023      Surgeon: Lisette Collazo):  Yamileth Ayala MD    Procedure: Procedure(s):  CARPAL TUNNEL RELEASE    Medications prior to admission:   Prior to Admission medications    Medication Sig Start Date End Date Taking? Authorizing Provider   acetaminophen (TYLENOL) 500 MG tablet Take 1,300 mg by mouth every 6 hours as needed for Pain   Yes Historical Provider, MD   Multiple Vitamin (MULTIVITAMIN ADULT PO) Take by mouth daily   Yes Historical Provider, MD   ZINC PO Take by mouth daily   Yes Historical Provider, MD   gabapentin (NEURONTIN) 100 MG capsule Take 100 mg by mouth as needed.   Patient not taking: Reported on 2/10/2023    Historical Provider, MD   ibuprofen (ADVIL;MOTRIN) 200 MG tablet Take 400 mg by mouth as needed    Ar Automatic Reconciliation       Current medications:    Current Facility-Administered Medications   Medication Dose Route Frequency Provider Last Rate Last Admin    acetaminophen (TYLENOL) tablet 1,000 mg  1,000 mg Oral Once Anabell Castellon MD        fentaNYL (SUBLIMAZE) injection 100 mcg  100 mcg IntraVENous Once PRN Anabell Castellon MD        lactated ringers IV soln infusion   IntraVENous Continuous Anabell Castellon  mL/hr at 02/10/23 1010 New Bag at 02/10/23 1010    sodium chloride flush 0.9 % injection 5-40 mL  5-40 mL IntraVENous 2 times per day Anabell Castellon MD        sodium chloride flush 0.9 % injection 5-40 mL  5-40 mL IntraVENous PRN Anabell Castellon MD        0.9 % sodium chloride infusion   IntraVENous PRN Anabell Castellon MD        midazolam PF (VERSED) injection 2 mg  2 mg IntraVENous Once PRN Anabell Castellon MD        sodium chloride flush 0.9 % injection 5-40 mL  5-40 mL IntraVENous 2 times per day Yamileth Ayala MD        sodium chloride flush 0.9 % injection 5-40 mL  5-40 mL IntraVENous PRN Jareth Ponce MD        ceFAZolin (ANCEF) 2000 mg in sterile water 20 mL IV syringe  2,000 mg IntraVENous On Call to Truong Haile MD           Allergies: Allergies   Allergen Reactions    Povidone-Iodine Rash       Problem List:    Patient Active Problem List   Diagnosis Code    Cervical radiculopathy M54.12    Disc-osteophyte complex M50.30, M25.78    Numbness and tingling in right hand R20.0, R20.2    Cervical spinal stenosis M48.02    Neuroforaminal stenosis of cervical spine M48.02    Carpal tunnel syndrome, right upper limb G56.01    Degenerative disc disease, cervical M50.30    Left carpal tunnel syndrome G56.02       Past Medical History:        Diagnosis Date    Arthritis     cervical and knees    BMI 24.0-24.9, adult     BMI 24    Carpal tunnel syndrome, right     Chronic pain     neck       Past Surgical History:        Procedure Laterality Date    CERVICAL FUSION  10/22/2020    ACDF C5-7 Dr Robin Mckeon ARTHROSCOPY Right 1987 and 1993    WISDOM TOOTH EXTRACTION         Social History:    Social History     Tobacco Use    Smoking status: Never    Smokeless tobacco: Never   Substance Use Topics    Alcohol use:  Yes     Alcohol/week: 0.0 - 1.0 standard drinks     Comment: occassionally                                Counseling given: Not Answered      Vital Signs (Current):   Vitals:    01/30/23 1447 02/10/23 1006   BP:  (!) 140/76   Pulse:  72   Resp:  16   Temp:  98.2 °F (36.8 °C)   TempSrc:  Infrared   SpO2:  98%   Weight: 180 lb (81.6 kg)    Height: 6' (1.829 m)                                               BP Readings from Last 3 Encounters:   02/10/23 (!) 140/76   01/26/23 124/82   10/20/21 122/74       NPO Status: Time of last liquid consumption: 0000                        Time of last solid consumption: 0000                        Date of last liquid consumption: 02/09/23                        Date of last solid food consumption: 02/09/23    BMI:   Wt Readings from Last 3 Encounters:   01/30/23 180 lb (81.6 kg)   01/26/23 181 lb (82.1 kg)   10/20/21 184 lb (83.5 kg)     Body mass index is 24.41 kg/m². CBC:   Lab Results   Component Value Date/Time    WBC 4.4 10/12/2020 01:23 PM    RBC 4.95 10/12/2020 01:23 PM    HGB 15.2 10/12/2020 01:23 PM    HCT 43.6 10/12/2020 01:23 PM    MCV 88.1 10/12/2020 01:23 PM    RDW 11.7 10/12/2020 01:23 PM     10/12/2020 01:23 PM       CMP:   Lab Results   Component Value Date/Time     10/12/2020 01:23 PM    K 4.1 10/12/2020 01:23 PM     10/12/2020 01:23 PM    CO2 33 10/12/2020 01:23 PM    BUN 15 10/12/2020 01:23 PM    CREATININE 1.15 10/12/2020 01:23 PM    GFRAA >60 10/12/2020 01:23 PM    GLUCOSE 82 10/12/2020 01:23 PM    CALCIUM 9.2 10/12/2020 01:23 PM       POC Tests: No results for input(s): POCGLU, POCNA, POCK, POCCL, POCBUN, POCHEMO, POCHCT in the last 72 hours. Coags: No results found for: PROTIME, INR, APTT    HCG (If Applicable): No results found for: PREGTESTUR, PREGSERUM, HCG, HCGQUANT     ABGs: No results found for: PHART, PO2ART, XNC7UNQ, BBQ1XBI, BEART, X4PWDFSN     Type & Screen (If Applicable):  No results found for: LABABO, LABRH    Drug/Infectious Status (If Applicable):  No results found for: HIV, HEPCAB    COVID-19 Screening (If Applicable):   Lab Results   Component Value Date/Time    COVID19 Not Detected 10/12/2020 11:27 AM           Anesthesia Evaluation  Patient summary reviewed no history of anesthetic complications:   Airway: Mallampati: I  TM distance: >3 FB   Neck ROM: full  Mouth opening: > = 3 FB   Dental: normal exam         Pulmonary:Negative Pulmonary ROS breath sounds clear to auscultation                             Cardiovascular:  Exercise tolerance: good (>4 METS),           Rhythm: regular  Rate: normal                    Neuro/Psych:      Neuromuscular disease: CTS; cervical radiculopathy.            GI/Hepatic/Renal: Omi Baig GI/Hepatic/Renal ROS            Endo/Other:    (+) : arthritis:., .                  ROS comment: DDD Abdominal:             Vascular: negative vascular ROS. Other Findings:           Anesthesia Plan      TIVA     ASA 2       Induction: intravenous. Anesthetic plan and risks discussed with patient.                         Prudence MD Rosa   2/10/2023

## 2023-02-10 NOTE — DISCHARGE INSTRUCTIONS
Carpal Tunnel Release: What to Expect at Home    Your Recovery    Your hand will hurt and may feel weak with some numbness. This usually goes away in a few days, but it may take several months. Your doctor may remove the large bandage, or he or she will tell you when and how to remove it yourself. In some cases, you may have a splint. If you have one, you will wear it for about 2 weeks. Your doctor will take out your stitches in 1 to 2 weeks. When you can return to work depends on the type of surgery you had, whether the surgery was on your dominant hand (the hand you use most), and your work activities. If you had open surgery on your dominant hand and do repeated actions at work, you may return to work in 10 to 8 weeks. Repeated motions include typing or assembly line work. If surgery is on the other hand and you do not do repeated actions at work, you may return to work in 9 to 14 days. Some people go back sooner. If you had endoscopic surgery on your dominant hand and do repeated actions at work, you may return to work in about 2 to 4 weeks. If surgery is on the other hand and you do not do repeated activities at work, you may return to work in about 1 to 2 days. This care sheet gives you a general idea about how long it will take for you to recover. But each person recovers at a different pace. Follow the steps below to get better as quickly as possible. How can you care for yourself at home? Activity  Rest when you feel tired. Getting enough sleep will help you recover. Try to walk each day. Start by walking a little more than you did the day before. Bit by bit, increase the amount you walk. For up to 2 weeks after surgery, avoid lifting things heavier than 1 to 2 pounds and using your hand. This includes doing repeated arm or hand movements, such as typing or using a computer mouse, washing windows, vacuuming, or chopping food.  Do not use power tools, and avoid activities that cause vibration. You may begin heavier tasks about 4 weeks after surgery. These include vacuuming, mowing the lawn, and gardening. You may shower 24 to 48 hours after surgery, if your doctor okays it. Keep your bandage dry by covering it with plastic. If you have a splint, keep it dry. Your doctor will tell you whether you can remove it when you shower. Do not take a bath until the incision heals, or until your doctor tells you it is okay. You may drive when you are fully able to use your hand. Diet  You can eat your normal diet. If your stomach is upset, try bland, low-fat foods like plain rice, broiled chicken, toast, and yogurt. Medicines  Take pain medicines exactly as directed. If the doctor gave you a prescription medicine for pain, take it as prescribed. If you think your pain medicine is making you sick to your stomach: Take your medicine after meals (unless your doctor has told you not to). Ask your doctor for a different pain medicine. If your doctor prescribed antibiotics, take them as directed. Do not stop taking them just because you feel better. You need to take the full course of antibiotics. Medication Interaction:  During your procedure you potentially received a medication or medications which may reduce the effectiveness of oral contraceptives. Please consider other forms of contraception for 1 month following your procedure if you are currently using oral contraceptives as your primary form of birth control. In addition to this, we recommend continuing your oral contraceptive as prescribed, unless otherwise instructed by your physician, during this time. Incision and splint care  Keep your bandage dry. If it gets dirty, you may change it. If you have a splint, talk to your doctor about when you should wear it. Exercise  You may need wrist and hand rehabilitation. This is a series of exercises you do after your surgery.  This helps you get back your wrist's and hand's range of motion, strength, and . You will work with your doctor and physical therapist to plan this exercise program. To get the best results, you need to do the exercises correctly and as often and as long as your doctor tells you. Ice and elevation  Put ice or a cold pack on your wrist for 10 to 15 minutes at a time. Try to do this every 1 to 2 hours for the next 3 days (when you are awake) or until the swelling goes down. Put a thin cloth between the ice and your skin. Prop up the sore wrist on a pillow when you ice it or anytime you sit or lie down during the next 3 days. Try to keep it above the level of your heart. This will help reduce swelling. Avoid letting your hand hang down. This can cause swelling. Follow-up care is a key part of your treatment and safety. Be sure to make and go to all appointments, and call your doctor if you are having problems. It's also a good idea to know your test results and keep a list of the medicines you take. When should you call for help? Call 911 anytime you think you may need emergency care. For example, call if:  You pass out (lose consciousness). You have severe trouble breathing. You have sudden chest pain and shortness of breath, or you cough up blood. Call your doctor now or seek immediate medical care if:  You have pain that does not get better after you take pain medicine. You have loose stitches, or your incision comes open. You continue to bleed from the incision after surgery. You have signs of infection, such as: Increased pain, swelling, warmth, or redness. Red streaks leading from the incision. Pus draining from the incision. Swollen lymph nodes in your neck, armpits, or groin. A fever. Your hand is cool or pale, or it changes color. Watch closely for any changes in your health, and be sure to contact your doctor if:  You notice more tingling or weakness in your hand. You are sick to your stomach or cannot keep fluids down.   You are not getting better as expected. After general anesthesia or intravenous sedation, for 24 hours or while taking prescription Narcotics:  Limit your activities  A responsible adult needs to be with you for the next 24 hours  Do not drive and operate hazardous machinery  Do not make important personal or business decisions  Do not drink alcoholic beverages  If you have not urinated within 8 hours after discharge, and you are experiencing discomfort from urinary retention, please go to the nearest ED. If you have sleep apnea and have a CPAP machine, please use it for all naps and sleeping. Please use caution when taking narcotics and any of your home medications that may cause drowsiness. *  Please give a list of your current medications to your Primary Care Provider. *  Please update this list whenever your medications are discontinued, doses are      changed, or new medications (including over-the-counter products) are added. *  Please carry medication information at all times in case of emergency situations. These are general instructions for a healthy lifestyle:  No smoking/ No tobacco products/ Avoid exposure to second hand smoke  Surgeon General's Warning:  Quitting smoking now greatly reduces serious risk to your health. Obesity, smoking, and sedentary lifestyle greatly increases your risk for illness  A healthy diet, regular physical exercise & weight monitoring are important for maintaining a healthy lifestyle    You may be retaining fluid if you have a history of heart failure or if you experience any of the following symptoms:  Weight gain of 3 pounds or more overnight or 5 pounds in a week, increased swelling in our hands or feet or shortness of breath while lying flat in bed. Please call your doctor as soon as you notice any of these symptoms; do not wait until your next office visit.

## 2023-02-10 NOTE — BRIEF OP NOTE
Brief Postoperative Note      Patient: Sunday Mccoy  YOB: 1965  MRN: [id number]    Date of Procedure: 2/10/2023    Pre-Op Diagnosis: Carpal tunnel syndrome, right [G56.01]    Post-Op Diagnosis: Same       Procedure(s):  CARPAL TUNNEL RELEASE    Surgeon(s):  Mahi Bender MD    Assistant:  * No surgical staff found *    Anesthesia: Monitor Anesthesia Care    Estimated Blood Loss (mL): Minimal    Complications: None    Specimens:   * No specimens in log *    Implants:  * No implants in log *      Drains: * No LDAs found *    Findings: stenosis    Electronically signed by Arash Mccray MD on 2/10/2023 at 12:38 PM

## 2023-02-11 NOTE — OP NOTE
[facility]  OPERATIVE REPORT    Name:  Lambert Askew  MR#:  [id number]  :  1965  ACCOUNT #:  [de-identified]  DATE OF SERVICE:  02/10/2023    PREOPERATIVE DIAGNOSIS:  Right carpal tunnel syndrome. POSTOPERATIVE DIAGNOSIS:  Right carpal tunnel syndrome. PROCEDURE PERFORMED:  Right carpal tunnel release. SURGEON:  Tyler Stein MD    ASSISTANT:  None. ANESTHESIA:  MAC plus local.    COMPLICATIONS:  None. SPECIMENS REMOVED:  None. IMPLANTS:  None. ESTIMATED BLOOD LOSS:  Minimal.    PREPARATION:  ChloraPrep. HISTORY OF PRESENT ILLNESS:  A 48-year-old gentleman with intractable hand pain, numbness, tingling and weakness refractory to conservative measures including surgical intervention on cervical spine. EMG was positive for severe right carpal tunnel syndrome. He is admitted for outpatient right carpal tunnel release. OPERATIVE NOTE:  The patient was brought to the operating room and placed under MAC anesthesia. The right upper extremity was prepped and draped in usual sterile fashion. A linear incision was drawn in the mid palm region from the distal wrist crease to the mid palm. This was then infiltrated with 1% Xylocaine with epinephrine and incised with a 15-blade and carried sharply down to the palmaris brevis muscle, which was transected using small smooth strokes with the 15-blade. The transverse carpal ligament was identified which was particularly thickened. Nevertheless, it was divided with the 15-blade using small smooth strokes to decompress a hyperemic median nerve. The nerve was then traced distally to the palm fat pad and proximally to the forearm and stenosis was relieved with Metzenbaum scissors. No further compression was noted. The wound was irrigated until clear. Bleeding points were cauterized with bipolar cautery and the wound was closed. Subcutaneous tissue was closed with interrupted 3-0 Vicryl.   Skin was closed with running locked 3-0 nylon suture and sterile dressings were placed. A large hand dressing was placed. The patient tolerated the procedure well, was awakened and transported to PACU in stable condition. There were no obvious complications.         MD LIZA [email or web address]  D:  02/10/2023 12:56  T:  02/10/2023 19:17  JOB #:  [id number]

## 2023-02-28 ENCOUNTER — NURSE ONLY (OUTPATIENT)
Dept: NEUROSURGERY | Age: 58
End: 2023-02-28

## 2023-02-28 VITALS
HEART RATE: 75 BPM | SYSTOLIC BLOOD PRESSURE: 116 MMHG | DIASTOLIC BLOOD PRESSURE: 69 MMHG | TEMPERATURE: 97.9 F | OXYGEN SATURATION: 99 % | WEIGHT: 183.13 LBS | HEIGHT: 72 IN | BODY MASS INDEX: 24.81 KG/M2

## 2023-02-28 DIAGNOSIS — G56.01 CARPAL TUNNEL SYNDROME, RIGHT UPPER LIMB: Primary | ICD-10-CM

## 2023-02-28 NOTE — PROGRESS NOTES
Subjective: Patient denies any discomfort and is right handed    Consent: verbal consent obtained and given by the patient. Risks of removal include bleeding,infection,reopening and excessive scarring. Objective:  /69 (Site: Left Upper Arm)   Pulse 75   Temp 97.9 °F (36.6 °C) (Temporal)   Ht 6' (1.829 m)   Wt 183 lb 2 oz (83.1 kg)   SpO2 99%   BMI 24.84 kg/m²   Pain Scale: 1/10    General: patient is cooperative; mood and affect are appropriate; denies any fevers,chills or headache; denies any fatigue or weakness  HEENT:trachea is midline; no voice, visual or swallowing difficulty; good range of motion with neck; mucous membranes are normal  Neuro:alert and oriented; denies any dizziness; gait and coordination observed are normal; ambulates without assistance  Wound:right hand ace and dressings removed from right hand/arm. Suture line healing well with no signs of infection and is well approximated. Hand washed and cleansed with alcohol- clipped and removed all sutures and intact. Incision re cleansed, benzoin applied and steri strips were placed  Number of sutures/staples removed: several sutures    Assessment:  Post op check: Patient is here for initial post-op check.  Dr. Laly Eduardo   is the supervising physician in clinic today and approves of today's treatment plan      Plan:  Post-op instructions: strict 2-5 pound weight limit; frequent positional changes; no bending ,lifting or twisting;instructed on proper body mechanics; instructed may drive with 2 hands on the wheel, not to fall and elevate right arm above heart level in the evening  Incisional care: not to saturate and pat dry;do not itch scratch or submerge  Management and expected pain:advised patient of expected nerve swelling and length of time for nerve healing  Post-op complications: patient instructed to observe for infection, deep vein thrombosis,constipation and pneumonia  Therapy/exercises: demonstrated simple strengthening hand exercises with an exercise ball in one week  Return appointment with any follow-up films:Patient demonstrated knowledge of procedure and post op instructions; patient was given the opportunity for questions and clarification was provided

## 2023-02-28 NOTE — PATIENT INSTRUCTIONS
ostoperative Instructions   Carpal Tunnel/Ulnar Transposition    Showering: You may shower using soap and water after your dressing has been removed. Please do not immerse your incision in dish water, bath tub, hot tub or swimming pool for 3 weeks. Wound Care: A small amount of reddish drainage on the dressing is normal for the first several days after surgery. You may desire to use cocoa butter or vitamin E on the incision one week after your suture appointment. It is not necessary to use any other types of cleanser on your incision except soap and water. Sign of Infection: Extreme tenderness at the wound, excessive redness or swelling of the hand, green or yellow drainage, fever of 101 or greater. If you think you have a wound infection please contact our office immediately. Driving: You may drive. Medications: You may take anti-inflammatory medications such as Advil, Motrin, and Aleve if desired. You may take the pain medication you were prescribed or you may take Tylenol if preferred. Activity: Please do not lift anything heavier than 5 pounds with the hand that surgery was performed on. You may gradually resume your activity. You will be given a foam rubber ball with which to do some gentle exercises with this hand. Please practice squeezing this ball in your hand several times during the day. It is important that you use your fingers and exercise the hand so the postoperative swelling will subside. However, you should avoid any strenuous or repetitive motions with the hand/arm until you see your physician again. Symptoms After Surgery: Don't be alarmed if you experience some pain after surgery. It takes time for healing to take place. Most patients will experience some throbbing, numbness or some hand swelling. These symptoms usually resolve with time. You may use ice for the swelling 20 minutes at a time several times a day and/or elevate your arm above your heart to relieve these symptoms.  You may notice that while holding your arm down by your side you experience some sharp shooting pain at the incision site- this is normal. On occasion carpal tunnel incisions split open before they heal due to the tough skin in the palm area. If your incision opens and you are concerned we will be happy to evaluate it, however, this is common and it will need to heal from the inside out so it will not be resutured or glued. You are welcome to cover the incision if open, especially if going out of your home and there is a possibility that something could get in the wound. Please do not use any ointments or creams if your incision is open. If you are experiencing any drainage cover and call the office immediately and you will be placed on an antibiotic and monitored while healing.  Follow-Up: You will be given a follow-up appointment after your suture visit.    Please call our office if you have any other questions or problems. Listen to your body, it will tell you if you are over doing it. Use common sense and take care of yourself. It will take time to recover from surgery.

## 2023-03-16 ENCOUNTER — OFFICE VISIT (OUTPATIENT)
Dept: NEUROSURGERY | Age: 58
End: 2023-03-16

## 2023-03-16 VITALS
HEIGHT: 72 IN | DIASTOLIC BLOOD PRESSURE: 61 MMHG | TEMPERATURE: 98.6 F | SYSTOLIC BLOOD PRESSURE: 110 MMHG | BODY MASS INDEX: 24.38 KG/M2 | HEART RATE: 66 BPM | WEIGHT: 180 LBS | OXYGEN SATURATION: 96 %

## 2023-03-16 DIAGNOSIS — G56.01 CARPAL TUNNEL SYNDROME, RIGHT UPPER LIMB: Primary | ICD-10-CM

## 2023-03-16 PROCEDURE — 99024 POSTOP FOLLOW-UP VISIT: CPT | Performed by: NEUROLOGICAL SURGERY

## 2023-03-16 NOTE — PROGRESS NOTES
History of Present Illness    Patient Words: 60 y.o. This patient is a 60 y.o. male who presents today for postsurgical follow-up status post right carpal tunnel release. Preoperative numbness and tingling at night have totally resolved. He is performing exercises with the ball and notes better dexterity and strength. Soft tissue swelling in the hand is decreased. Sensitivity is decreased. Overall he feels that there has been definite progressive improvement. Past Medical History:   Diagnosis Date    Arthritis     cervical and knees    BMI 24.0-24.9, adult     BMI 24    Carpal tunnel syndrome, right     Chronic pain     neck        Allergies   Allergen Reactions    Povidone-Iodine Rash        Family History   Problem Relation Age of Onset    COPD Father     Heart Attack Father     Heart Attack Brother     Diabetes Brother     Heart Disease Father     Breast Cancer Sister     No Known Problems Sister     Heart Disease Brother     Diabetes Father 48    No Known Problems Mother         Social History     Socioeconomic History    Marital status:      Spouse name: Not on file    Number of children: Not on file    Years of education: Not on file    Highest education level: Not on file   Occupational History    Not on file   Tobacco Use    Smoking status: Never    Smokeless tobacco: Never   Vaping Use    Vaping Use: Never used   Substance and Sexual Activity    Alcohol use:  Yes     Alcohol/week: 0.0 - 1.0 standard drinks     Comment: occassionally    Drug use: No    Sexual activity: Not on file   Other Topics Concern    Not on file   Social History Narrative    Not on file     Social Determinants of Health     Financial Resource Strain: Not on file   Food Insecurity: Not on file   Transportation Needs: Not on file   Physical Activity: Not on file   Stress: Not on file   Social Connections: Not on file   Intimate Partner Violence: Not on file   Housing Stability: Not on file       Current Outpatient Medications   Medication Sig Dispense Refill    acetaminophen (TYLENOL) 500 MG tablet Take 1,300 mg by mouth every 6 hours as needed for Pain      Multiple Vitamin (MULTIVITAMIN ADULT PO) Take by mouth daily      ZINC PO Take by mouth daily      ibuprofen (ADVIL;MOTRIN) 200 MG tablet Take 400 mg by mouth as needed       No current facility-administered medications for this visit. Patient Active Problem List   Diagnosis    Cervical radiculopathy    Disc-osteophyte complex    Numbness and tingling in right hand    Cervical spinal stenosis    Neuroforaminal stenosis of cervical spine    Carpal tunnel syndrome, right upper limb    Degenerative disc disease, cervical    Left carpal tunnel syndrome        Review of Systems: A complete ROS was done and as stated in the HPI or otherwise negative. /61   Pulse 66   Temp 98.6 °F (37 °C) (Temporal)   Ht 6' (1.829 m)   Wt 180 lb (81.6 kg)   SpO2 96%   BMI 24.41 kg/m²        Physical Exam  The physical exam findings are as follows:General    Incision healing well without complications     Neurologic  Sensory:  Pain: Intact- To Pin Prick  Motor:  Tone: Normal  Strength: 5/5 normal muscle strength- left hand finger abduction, right hand finger abduction, left thumb opposition, right  and left      Assessment & Plan      ICD-10-CM    1. Carpal tunnel syndrome, right upper limb  G56.01          Overall doing well. Continue on with home exercise program.  Recheck in 8 weeks.     Dain Lovelace MD

## 2023-05-11 ENCOUNTER — OFFICE VISIT (OUTPATIENT)
Dept: NEUROSURGERY | Age: 58
End: 2023-05-11

## 2023-05-11 VITALS
DIASTOLIC BLOOD PRESSURE: 64 MMHG | TEMPERATURE: 97.3 F | WEIGHT: 183 LBS | OXYGEN SATURATION: 96 % | HEART RATE: 75 BPM | SYSTOLIC BLOOD PRESSURE: 115 MMHG | HEIGHT: 72 IN | BODY MASS INDEX: 24.79 KG/M2

## 2023-05-11 DIAGNOSIS — G56.02 LEFT CARPAL TUNNEL SYNDROME: ICD-10-CM

## 2023-05-11 DIAGNOSIS — G56.01 CARPAL TUNNEL SYNDROME, RIGHT UPPER LIMB: Primary | ICD-10-CM

## 2023-05-11 PROCEDURE — 99024 POSTOP FOLLOW-UP VISIT: CPT | Performed by: NEUROLOGICAL SURGERY

## 2023-05-11 NOTE — PROGRESS NOTES
History of Present Illness    Patient Words: 60 y.o. This patient is a 60 y.o. male who presents today for postoperative follow-up. Status post right carpal tunnel release 3 months ago. Overall doing extremely well. Mild residual right thumb numbness is improving. Strength is improving and hand is useful. Nocturnal numbness and discomfort in no longer present. He still has carpal tunnel symptoms and symptomatology of the left upper extremity which he feels is getting worse. This has not been tested however with EMG and nerve conduction studies. Past Medical History:   Diagnosis Date    Arthritis     cervical and knees    BMI 24.0-24.9, adult     BMI 24    Carpal tunnel syndrome, right     Chronic pain     neck        Allergies   Allergen Reactions    Povidone-Iodine Rash        Family History   Problem Relation Age of Onset    COPD Father     Heart Attack Father     Heart Attack Brother     Diabetes Brother     Heart Disease Father     Breast Cancer Sister     No Known Problems Sister     Heart Disease Brother     Diabetes Father 48    No Known Problems Mother         Social History     Socioeconomic History    Marital status:      Spouse name: Not on file    Number of children: Not on file    Years of education: Not on file    Highest education level: Not on file   Occupational History    Not on file   Tobacco Use    Smoking status: Never    Smokeless tobacco: Never   Vaping Use    Vaping Use: Never used   Substance and Sexual Activity    Alcohol use:  Yes     Alcohol/week: 0.0 - 1.0 standard drinks     Comment: occassionally    Drug use: No    Sexual activity: Not on file   Other Topics Concern    Not on file   Social History Narrative    Not on file     Social Determinants of Health     Financial Resource Strain: Not on file   Food Insecurity: Not on file   Transportation Needs: Not on file   Physical Activity: Not on file   Stress: Not on file   Social Connections: Not on file   Intimate

## 2023-07-05 ENCOUNTER — PROCEDURE VISIT (OUTPATIENT)
Dept: PHYSICAL MEDICINE AND REHAB | Age: 58
End: 2023-07-05
Payer: COMMERCIAL

## 2023-07-05 VITALS
WEIGHT: 183 LBS | HEIGHT: 72 IN | BODY MASS INDEX: 24.79 KG/M2 | HEART RATE: 67 BPM | SYSTOLIC BLOOD PRESSURE: 123 MMHG | DIASTOLIC BLOOD PRESSURE: 77 MMHG

## 2023-07-05 DIAGNOSIS — G56.02 LEFT CARPAL TUNNEL SYNDROME: Primary | ICD-10-CM

## 2023-07-05 PROCEDURE — 95886 MUSC TEST DONE W/N TEST COMP: CPT | Performed by: PHYSICAL MEDICINE & REHABILITATION

## 2023-07-05 PROCEDURE — 95909 NRV CNDJ TST 5-6 STUDIES: CPT | Performed by: PHYSICAL MEDICINE & REHABILITATION

## 2023-07-18 ENCOUNTER — OFFICE VISIT (OUTPATIENT)
Dept: NEUROSURGERY | Age: 58
End: 2023-07-18
Payer: COMMERCIAL

## 2023-07-18 VITALS
OXYGEN SATURATION: 98 % | WEIGHT: 183 LBS | TEMPERATURE: 98.4 F | SYSTOLIC BLOOD PRESSURE: 129 MMHG | HEART RATE: 106 BPM | DIASTOLIC BLOOD PRESSURE: 75 MMHG | BODY MASS INDEX: 24.79 KG/M2 | HEIGHT: 72 IN

## 2023-07-18 DIAGNOSIS — G56.02 LEFT CARPAL TUNNEL SYNDROME: Primary | ICD-10-CM

## 2023-07-18 PROCEDURE — 99213 OFFICE O/P EST LOW 20 MIN: CPT | Performed by: NEUROLOGICAL SURGERY

## 2023-07-18 NOTE — PROGRESS NOTES
History of Present Illness    Patient Words: 60 y.o. This patient is a 60 y.o. male who presents today for neurosurgical evaluation. EMG/nerve conduction velocity testing of the left upper extremity reveals grade 4 severe carpal tunnel syndrome with no evidence of cervical radiculopathy. His right carpal tunnel release has gone very well. Unfortunately has to wear a wrist splint at night which is not totally effective he still has some breakthrough numbness and tingling which will awaken him. This no longer occurs on the right side. Past Medical History:   Diagnosis Date    Arthritis     cervical and knees    BMI 24.0-24.9, adult     BMI 24    Carpal tunnel syndrome, right     Chronic pain     neck        Allergies   Allergen Reactions    Povidone-Iodine Rash        Family History   Problem Relation Age of Onset    COPD Father     Heart Attack Father     Heart Attack Brother     Diabetes Brother     Heart Disease Father     Breast Cancer Sister     No Known Problems Sister     Heart Disease Brother     Diabetes Father 48    No Known Problems Mother         Social History     Socioeconomic History    Marital status:      Spouse name: Not on file    Number of children: Not on file    Years of education: Not on file    Highest education level: Not on file   Occupational History    Not on file   Tobacco Use    Smoking status: Never    Smokeless tobacco: Never   Vaping Use    Vaping Use: Never used   Substance and Sexual Activity    Alcohol use:  Yes     Alcohol/week: 0.0 - 1.0 standard drinks     Comment: occassionally    Drug use: No    Sexual activity: Not on file   Other Topics Concern    Not on file   Social History Narrative    Not on file     Social Determinants of Health     Financial Resource Strain: Not on file   Food Insecurity: Not on file   Transportation Needs: Not on file   Physical Activity: Not on file   Stress: Not on file   Social Connections: Not on file   Intimate Partner Violence:

## 2024-02-26 SDOH — HEALTH STABILITY: PHYSICAL HEALTH: ON AVERAGE, HOW MANY MINUTES DO YOU ENGAGE IN EXERCISE AT THIS LEVEL?: 40 MIN

## 2024-02-26 SDOH — HEALTH STABILITY: PHYSICAL HEALTH: ON AVERAGE, HOW MANY DAYS PER WEEK DO YOU ENGAGE IN MODERATE TO STRENUOUS EXERCISE (LIKE A BRISK WALK)?: 3 DAYS

## 2024-02-27 ENCOUNTER — OFFICE VISIT (OUTPATIENT)
Dept: INTERNAL MEDICINE CLINIC | Facility: CLINIC | Age: 59
End: 2024-02-27
Payer: COMMERCIAL

## 2024-02-27 VITALS
TEMPERATURE: 98 F | WEIGHT: 193.8 LBS | OXYGEN SATURATION: 100 % | SYSTOLIC BLOOD PRESSURE: 125 MMHG | DIASTOLIC BLOOD PRESSURE: 77 MMHG | HEIGHT: 72 IN | HEART RATE: 79 BPM | BODY MASS INDEX: 26.25 KG/M2

## 2024-02-27 DIAGNOSIS — Z12.11 COLON CANCER SCREENING: ICD-10-CM

## 2024-02-27 DIAGNOSIS — E55.9 VITAMIN D DEFICIENCY: ICD-10-CM

## 2024-02-27 DIAGNOSIS — Z00.00 ANNUAL PHYSICAL EXAM: ICD-10-CM

## 2024-02-27 DIAGNOSIS — G56.02 LEFT CARPAL TUNNEL SYNDROME: ICD-10-CM

## 2024-02-27 DIAGNOSIS — Z12.5 PROSTATE CANCER SCREENING: ICD-10-CM

## 2024-02-27 DIAGNOSIS — E66.3 OVERWEIGHT (BMI 25.0-29.9): ICD-10-CM

## 2024-02-27 DIAGNOSIS — Z76.89 ESTABLISHING CARE WITH NEW DOCTOR, ENCOUNTER FOR: Primary | ICD-10-CM

## 2024-02-27 LAB
25(OH)D3 SERPL-MCNC: 20.7 NG/ML (ref 30–100)
ALBUMIN SERPL-MCNC: 4.2 G/DL (ref 3.2–4.6)
ALBUMIN/GLOB SERPL: 1.3 (ref 0.4–1.6)
ALP SERPL-CCNC: 34 U/L (ref 50–136)
ALT SERPL-CCNC: 27 U/L (ref 12–65)
ANION GAP SERPL CALC-SCNC: 3 MMOL/L (ref 2–11)
AST SERPL-CCNC: 22 U/L (ref 15–37)
BILIRUB SERPL-MCNC: 1.1 MG/DL (ref 0.2–1.1)
BUN SERPL-MCNC: 14 MG/DL (ref 8–23)
CALCIUM SERPL-MCNC: 9.8 MG/DL (ref 8.3–10.4)
CHLORIDE SERPL-SCNC: 104 MMOL/L (ref 103–113)
CHOLEST SERPL-MCNC: 194 MG/DL
CO2 SERPL-SCNC: 33 MMOL/L (ref 21–32)
CREAT SERPL-MCNC: 1.1 MG/DL (ref 0.8–1.5)
ERYTHROCYTE [DISTWIDTH] IN BLOOD BY AUTOMATED COUNT: 11.5 % (ref 11.9–14.6)
GLOBULIN SER CALC-MCNC: 3.3 G/DL (ref 2.8–4.5)
GLUCOSE SERPL-MCNC: 96 MG/DL (ref 65–100)
HCT VFR BLD AUTO: 46.1 % (ref 41.1–50.3)
HDLC SERPL-MCNC: 40 MG/DL (ref 40–60)
HDLC SERPL: 4.9
HGB BLD-MCNC: 15.7 G/DL (ref 13.6–17.2)
LDLC SERPL CALC-MCNC: 132.2 MG/DL
MCH RBC QN AUTO: 30.4 PG (ref 26.1–32.9)
MCHC RBC AUTO-ENTMCNC: 34.1 G/DL (ref 31.4–35)
MCV RBC AUTO: 89.3 FL (ref 82–102)
NRBC # BLD: 0 K/UL (ref 0–0.2)
PLATELET # BLD AUTO: 233 K/UL (ref 150–450)
PMV BLD AUTO: 9.5 FL (ref 9.4–12.3)
POTASSIUM SERPL-SCNC: 4.3 MMOL/L (ref 3.5–5.1)
PROT SERPL-MCNC: 7.5 G/DL (ref 6.3–8.2)
RBC # BLD AUTO: 5.16 M/UL (ref 4.23–5.6)
SODIUM SERPL-SCNC: 140 MMOL/L (ref 136–146)
TRIGL SERPL-MCNC: 109 MG/DL (ref 35–150)
TSH W FREE THYROID IF ABNORMAL: 1.25 UIU/ML (ref 0.36–3.74)
VLDLC SERPL CALC-MCNC: 21.8 MG/DL (ref 6–23)
WBC # BLD AUTO: 4.9 K/UL (ref 4.3–11.1)

## 2024-02-27 PROCEDURE — 99204 OFFICE O/P NEW MOD 45 MIN: CPT | Performed by: INTERNAL MEDICINE

## 2024-02-27 RX ORDER — GABAPENTIN 300 MG/1
300 CAPSULE ORAL 3 TIMES DAILY
Qty: 90 CAPSULE | Refills: 3 | Status: SHIPPED | OUTPATIENT
Start: 2024-02-27 | End: 2024-06-26

## 2024-02-27 RX ORDER — BUTYROSPERMUM PARKII(SHEA BUTTER), SIMMONDSIA CHINENSIS (JOJOBA) SEED OIL, ALOE BARBADENSIS LEAF EXTRACT .01; 1; 3.5 G/100G; G/100G; G/100G
5000 LIQUID TOPICAL DAILY
Qty: 90 CAPSULE | Refills: 5 | Status: SHIPPED | OUTPATIENT
Start: 2024-02-27

## 2024-02-27 SDOH — ECONOMIC STABILITY: HOUSING INSECURITY
IN THE LAST 12 MONTHS, WAS THERE A TIME WHEN YOU DID NOT HAVE A STEADY PLACE TO SLEEP OR SLEPT IN A SHELTER (INCLUDING NOW)?: NO

## 2024-02-27 SDOH — ECONOMIC STABILITY: FOOD INSECURITY: WITHIN THE PAST 12 MONTHS, YOU WORRIED THAT YOUR FOOD WOULD RUN OUT BEFORE YOU GOT MONEY TO BUY MORE.: NEVER TRUE

## 2024-02-27 SDOH — ECONOMIC STABILITY: FOOD INSECURITY: WITHIN THE PAST 12 MONTHS, THE FOOD YOU BOUGHT JUST DIDN'T LAST AND YOU DIDN'T HAVE MONEY TO GET MORE.: NEVER TRUE

## 2024-02-27 SDOH — ECONOMIC STABILITY: INCOME INSECURITY: HOW HARD IS IT FOR YOU TO PAY FOR THE VERY BASICS LIKE FOOD, HOUSING, MEDICAL CARE, AND HEATING?: NOT HARD AT ALL

## 2024-02-27 ASSESSMENT — PATIENT HEALTH QUESTIONNAIRE - PHQ9
SUM OF ALL RESPONSES TO PHQ9 QUESTIONS 1 & 2: 0
SUM OF ALL RESPONSES TO PHQ QUESTIONS 1-9: 0
1. LITTLE INTEREST OR PLEASURE IN DOING THINGS: 0
SUM OF ALL RESPONSES TO PHQ QUESTIONS 1-9: 0
2. FEELING DOWN, DEPRESSED OR HOPELESS: 0

## 2024-02-27 ASSESSMENT — ANXIETY QUESTIONNAIRES
2. NOT BEING ABLE TO STOP OR CONTROL WORRYING: 0
7. FEELING AFRAID AS IF SOMETHING AWFUL MIGHT HAPPEN: 0
IF YOU CHECKED OFF ANY PROBLEMS ON THIS QUESTIONNAIRE, HOW DIFFICULT HAVE THESE PROBLEMS MADE IT FOR YOU TO DO YOUR WORK, TAKE CARE OF THINGS AT HOME, OR GET ALONG WITH OTHER PEOPLE: NOT DIFFICULT AT ALL
4. TROUBLE RELAXING: 0
6. BECOMING EASILY ANNOYED OR IRRITABLE: 0
1. FEELING NERVOUS, ANXIOUS, OR ON EDGE: 0
3. WORRYING TOO MUCH ABOUT DIFFERENT THINGS: 0
GAD7 TOTAL SCORE: 0

## 2024-02-28 NOTE — RESULT ENCOUNTER NOTE
Patient notified of results and recommendations from Dr. Kris Renae via [employer] and the patient can reach out to our office by call or [employer] message if they have any questions, comments and/or concerns on the matter.

## 2024-03-21 ENCOUNTER — OFFICE VISIT (OUTPATIENT)
Dept: ORTHOPEDIC SURGERY | Age: 59
End: 2024-03-21
Payer: COMMERCIAL

## 2024-03-21 VITALS — BODY MASS INDEX: 25.73 KG/M2 | WEIGHT: 190 LBS | HEIGHT: 72 IN

## 2024-03-21 DIAGNOSIS — G56.02 CARPAL TUNNEL SYNDROME, LEFT: Primary | ICD-10-CM

## 2024-03-21 PROCEDURE — 99204 OFFICE O/P NEW MOD 45 MIN: CPT | Performed by: ORTHOPAEDIC SURGERY

## 2024-03-21 NOTE — PROGRESS NOTES
Orthopaedic Hand Surgery Note    Name: Howie Ambrocio  YOB: 1965  Gender: male  MRN: [id number]    CC: New patient referred for hand numbness      HPI: Patient is a 59 y.o. male with a chief complaint of left hand numbness and tingling in the median nerve distribution. The symptoms have been going on for years. The patient does complain of night wakening and increased symptoms with driving. Evaluation to date has included nerve conduction study. Treatment to date has included brace.  He underwent right carpal tunnel release with Dr. Guzman about a year ago.  He did have good results with this.  He says however that the entire right arm goes numb if he sleeps on that side.     ROS/Meds/PSH/PMH/FH/SH: I personally reviewed the patients standard intake form.  Pertinents are discussed In the HPI    Physical Examination:    Musculoskeletal:     Examination of the left upper extremity demonstrates Decreased sensation to light touch in the median distribution, normal sensation in ulnar and radial distribution, Positive carpal tunnel compression testing and Phalen testing, cap refill < 5 seconds in all fingers. Inspection reveals no thenar atrophy. Negative Tinel and elbow flexion compression test of the cubital tunnel, negative Tinel over Guyon's canal. Sensation to light touch in the ulnar 2 digits is normal with no intrinsic atrophy/weakness. No tenderness to palpation or masses noted in the forearm.    Imaging / Electrodiagnostic Tests:     I independently reviewed and interpreted the patient's nerve conduction study.  He has findings consistent with severe left carpal tunnel syndrome.  Left median sensory latency is 4.7 ms, left median motor latency is 4.9 mill milliseconds, needle EMG is normal    Assessment:     ICD-10-CM    1. Carpal tunnel syndrome, left  G56.02 Case Request          Plan:  We discussed the diagnosis and different treatment options. We discussed observation, EMG/NCV, night

## 2024-03-21 NOTE — H&P (VIEW-ONLY)
Orthopaedic Hand Surgery Note    Name: Howie Ambrocio  YOB: 1965  Gender: male  MRN: [id number]    CC: New patient referred for hand numbness      HPI: Patient is a 59 y.o. male with a chief complaint of left hand numbness and tingling in the median nerve distribution. The symptoms have been going on for years. The patient does complain of night wakening and increased symptoms with driving. Evaluation to date has included nerve conduction study. Treatment to date has included brace.  He underwent right carpal tunnel release with Dr. Guzman about a year ago.  He did have good results with this.  He says however that the entire right arm goes numb if he sleeps on that side.     ROS/Meds/PSH/PMH/FH/SH: I personally reviewed the patients standard intake form.  Pertinents are discussed In the HPI    Physical Examination:    Musculoskeletal:     Examination of the left upper extremity demonstrates Decreased sensation to light touch in the median distribution, normal sensation in ulnar and radial distribution, Positive carpal tunnel compression testing and Phalen testing, cap refill < 5 seconds in all fingers. Inspection reveals no thenar atrophy. Negative Tinel and elbow flexion compression test of the cubital tunnel, negative Tinel over Guyon's canal. Sensation to light touch in the ulnar 2 digits is normal with no intrinsic atrophy/weakness. No tenderness to palpation or masses noted in the forearm.    Imaging / Electrodiagnostic Tests:     I independently reviewed and interpreted the patient's nerve conduction study.  He has findings consistent with severe left carpal tunnel syndrome.  Left median sensory latency is 4.7 ms, left median motor latency is 4.9 mill milliseconds, needle EMG is normal    Assessment:     ICD-10-CM    1. Carpal tunnel syndrome, left  G56.02 Case Request          Plan:  We discussed the diagnosis and different treatment options. We discussed observation, EMG/NCV, night

## 2024-03-27 ENCOUNTER — TELEPHONE (OUTPATIENT)
Dept: ORTHOPEDIC SURGERY | Age: 59
End: 2024-03-27

## 2024-04-02 ENCOUNTER — OFFICE VISIT (OUTPATIENT)
Dept: INTERNAL MEDICINE CLINIC | Facility: CLINIC | Age: 59
End: 2024-04-02
Payer: COMMERCIAL

## 2024-04-02 VITALS
DIASTOLIC BLOOD PRESSURE: 74 MMHG | SYSTOLIC BLOOD PRESSURE: 121 MMHG | WEIGHT: 190.8 LBS | HEART RATE: 80 BPM | HEIGHT: 72 IN | OXYGEN SATURATION: 99 % | BODY MASS INDEX: 25.84 KG/M2 | TEMPERATURE: 98 F

## 2024-04-02 DIAGNOSIS — Z12.11 COLON CANCER SCREENING: ICD-10-CM

## 2024-04-02 DIAGNOSIS — E78.2 MIXED HYPERLIPIDEMIA: Primary | ICD-10-CM

## 2024-04-02 DIAGNOSIS — E66.3 OVERWEIGHT (BMI 25.0-29.9): ICD-10-CM

## 2024-04-02 DIAGNOSIS — G56.02 CARPAL TUNNEL SYNDROME, LEFT: ICD-10-CM

## 2024-04-02 DIAGNOSIS — G56.01 CARPAL TUNNEL SYNDROME, RIGHT UPPER LIMB: ICD-10-CM

## 2024-04-02 DIAGNOSIS — E55.9 VITAMIN D DEFICIENCY: ICD-10-CM

## 2024-04-02 PROBLEM — M25.562 PAIN IN JOINT OF LEFT KNEE: Status: RESOLVED | Noted: 2024-01-01 | Resolved: 2024-04-02

## 2024-04-02 PROCEDURE — 99214 OFFICE O/P EST MOD 30 MIN: CPT | Performed by: INTERNAL MEDICINE

## 2024-04-02 RX ORDER — ATORVASTATIN CALCIUM 10 MG/1
10 TABLET, FILM COATED ORAL DAILY
Qty: 30 TABLET | Refills: 3 | Status: SHIPPED | OUTPATIENT
Start: 2024-04-02

## 2024-04-02 ASSESSMENT — PATIENT HEALTH QUESTIONNAIRE - PHQ9
SUM OF ALL RESPONSES TO PHQ QUESTIONS 1-9: 0
SUM OF ALL RESPONSES TO PHQ9 QUESTIONS 1 & 2: 0
SUM OF ALL RESPONSES TO PHQ QUESTIONS 1-9: 0
2. FEELING DOWN, DEPRESSED OR HOPELESS: NOT AT ALL
1. LITTLE INTEREST OR PLEASURE IN DOING THINGS: NOT AT ALL
SUM OF ALL RESPONSES TO PHQ QUESTIONS 1-9: 0
SUM OF ALL RESPONSES TO PHQ QUESTIONS 1-9: 0

## 2024-04-02 NOTE — PROGRESS NOTES
Alcohol/week: 3.0 standard drinks of alcohol     Types: 2 Glasses of wine, 1 Cans of beer per week     Comment: occassionally    Drug use: Never     Vitals:    04/02/24 1011   BP: 121/74   Site: Left Upper Arm   Position: Sitting   Cuff Size: Medium Adult   Pulse: 80   Temp: 98 °F (36.7 °C)   SpO2: 99%   Weight: 86.5 kg (190 lb 12.8 oz)   Height: 1.829 m (6')      Body mass index is 25.88 kg/m².  Physical Exam  HENT:      Head: Normocephalic.   Eyes:      Extraocular Movements: Extraocular movements intact.   Cardiovascular:      Rate and Rhythm: Normal rate and regular rhythm.      Pulses: Normal pulses.   Skin:     General: Skin is warm and dry.     Provider spent 30 minutes with patient, reviewing chart, records, labs, notes, and in face-to-face discussion.     An electronic signature was used to authenticate this note.  Kris Renae DO

## 2024-04-02 NOTE — ACP (ADVANCE CARE PLANNING)
Advance Care Planning   The patient has the following advanced directives on file:  Advance Directives       Power of  Living Will ACP-Advance Directive ACP-Power of     Not on File Not on File Not on File Filed            The patient has appointed the following active healthcare agents:    Primary Decision Maker: LollyAndry - Spouse - [phone]    The Patient has the following current code status:    Code Status: Prior    Visit Documentation:  I discussed Advance Care Planning with Howie Ambrocio today which included the importance of making their choices for care and treatment in the case of a health event that adversely affects their decision-making abilities. He has not completed the Advance Care Directives. He does not have an active health care agent at this time.  Howie Ambrocio was encouraged to complete the declaration forms and provide a signed copy of his medical records.  I advised patient we would continue this discussion at future visits.     Maynor Nj  4/2/2024

## 2024-04-11 NOTE — PERIOP NOTE
Preop department called to notify patient of arrival time for scheduled procedure. Instructions given to   - Arrive at [facility] En[facility] [address].  - Remain NPO after midnight, unless otherwise indicated, including gum, mints, and ice chips.   - Have a responsible adult to drive patient to the hospital, stay during surgery, and patient will need supervision 24 hours after anesthesia.   - Use antibacterial soap in shower the night before surgery and on the morning of surgery.       Was patient contacted: yes, spoke to patient.   Voicemail left:   Numbers contacted: [phone]   Arrival time: 0600

## 2024-04-11 NOTE — PERIOP NOTE
Patient verified name and .  Order for consent not found in EHR and matches case posting; patient verifies procedure.   Type 1A surgery, phone assessment complete.  Orders not received.  Labs per surgeon: none  Labs per anesthesia protocol: none    Patient answered medical/surgical history questions at their best of ability. All prior to admission medications documented in EPIC.    Patient instructed to continue taking all prescription medications up to the day of surgery but to take only the following medications the day of surgery according to anesthesia guidelines with a small sip of water: none Also, patient is requested to take 2 Tylenol in the morning and then again before bed on the day before surgery. Regular or extra strength may be used.       Patient informed that all vitamins and supplements should be held 7 days prior to surgery and NSAIDS 5 days prior to surgery. Prescription meds to hold:none    Patient instructed on the following:    > Arrive at [facility], time of arrival to be called the day before by 1700  > NPO after midnight, unless otherwise indicated, including gum, mints, and ice chips  > Responsible adult must drive patient to the hospital, stay during surgery, and patient will need supervision 24 hours after anesthesia  > Use non moisturizing soap in shower the night before surgery and on the morning of surgery  > All piercings must be removed prior to arrival.    > Leave all valuables (money and jewelry) at home but bring insurance card and ID on DOS.   > You may be required to pay a deductible or co-pay on the day of your procedure. You can pre-pay by calling [phone] if your surgery is at the [facility] or [phone] if your surgery is at the [facility].  > Do not wear make-up, nail polish, lotions, cologne, perfumes, powders, or oil on skin. Artificial nails are not permitted.

## 2024-04-12 ENCOUNTER — ANESTHESIA (OUTPATIENT)
Dept: SURGERY | Age: 59
End: 2024-04-12
Payer: COMMERCIAL

## 2024-04-12 ENCOUNTER — ANESTHESIA EVENT (OUTPATIENT)
Dept: SURGERY | Age: 59
End: 2024-04-12
Payer: COMMERCIAL

## 2024-04-12 ENCOUNTER — HOSPITAL ENCOUNTER (OUTPATIENT)
Age: 59
Setting detail: OUTPATIENT SURGERY
Discharge: HOME | End: 2024-04-12
Attending: ORTHOPAEDIC SURGERY | Admitting: ORTHOPAEDIC SURGERY
Payer: COMMERCIAL

## 2024-04-12 VITALS
DIASTOLIC BLOOD PRESSURE: 85 MMHG | HEART RATE: 60 BPM | SYSTOLIC BLOOD PRESSURE: 147 MMHG | WEIGHT: 187 LBS | HEIGHT: 72 IN | RESPIRATION RATE: 16 BRPM | BODY MASS INDEX: 25.33 KG/M2 | OXYGEN SATURATION: 94 % | TEMPERATURE: 97.1 F

## 2024-04-12 DIAGNOSIS — G56.02 CARPAL TUNNEL SYNDROME, LEFT: Primary | ICD-10-CM

## 2024-04-12 PROCEDURE — 3700000000 HC ANESTHESIA ATTENDED CARE: Performed by: ORTHOPAEDIC SURGERY

## 2024-04-12 PROCEDURE — 6360000002 HC RX W HCPCS: Performed by: ORTHOPAEDIC SURGERY

## 2024-04-12 PROCEDURE — 2500000003 HC RX 250 WO HCPCS: Performed by: NURSE ANESTHETIST, CERTIFIED REGISTERED

## 2024-04-12 PROCEDURE — 7100000010 HC PHASE II RECOVERY - FIRST 15 MIN: Performed by: ORTHOPAEDIC SURGERY

## 2024-04-12 PROCEDURE — 7100000001 HC PACU RECOVERY - ADDTL 15 MIN: Performed by: ORTHOPAEDIC SURGERY

## 2024-04-12 PROCEDURE — 3600000012 HC SURGERY LEVEL 2 ADDTL 15MIN: Performed by: ORTHOPAEDIC SURGERY

## 2024-04-12 PROCEDURE — 76998 US GUIDE INTRAOP: CPT | Performed by: ORTHOPAEDIC SURGERY

## 2024-04-12 PROCEDURE — 3600000002 HC SURGERY LEVEL 2 BASE: Performed by: ORTHOPAEDIC SURGERY

## 2024-04-12 PROCEDURE — 2720000010 HC SURG SUPPLY STERILE: Performed by: ORTHOPAEDIC SURGERY

## 2024-04-12 PROCEDURE — 2500000003 HC RX 250 WO HCPCS: Performed by: ORTHOPAEDIC SURGERY

## 2024-04-12 PROCEDURE — 7100000000 HC PACU RECOVERY - FIRST 15 MIN: Performed by: ORTHOPAEDIC SURGERY

## 2024-04-12 PROCEDURE — 6360000002 HC RX W HCPCS: Performed by: NURSE ANESTHETIST, CERTIFIED REGISTERED

## 2024-04-12 PROCEDURE — 2580000003 HC RX 258: Performed by: ANESTHESIOLOGY

## 2024-04-12 PROCEDURE — 6370000000 HC RX 637 (ALT 250 FOR IP): Performed by: ANESTHESIOLOGY

## 2024-04-12 PROCEDURE — 64721 CARPAL TUNNEL SURGERY: CPT | Performed by: ORTHOPAEDIC SURGERY

## 2024-04-12 PROCEDURE — 2709999900 HC NON-CHARGEABLE SUPPLY: Performed by: ORTHOPAEDIC SURGERY

## 2024-04-12 PROCEDURE — 3700000001 HC ADD 15 MINUTES (ANESTHESIA): Performed by: ORTHOPAEDIC SURGERY

## 2024-04-12 RX ORDER — SODIUM CHLORIDE 0.9 % (FLUSH) 0.9 %
5-40 SYRINGE (ML) INJECTION PRN
Status: DISCONTINUED | OUTPATIENT
Start: 2024-04-12 | End: 2024-04-12 | Stop reason: HOSPADM

## 2024-04-12 RX ORDER — SODIUM CHLORIDE 0.9 % (FLUSH) 0.9 %
5-40 SYRINGE (ML) INJECTION EVERY 12 HOURS SCHEDULED
Status: DISCONTINUED | OUTPATIENT
Start: 2024-04-12 | End: 2024-04-12 | Stop reason: HOSPADM

## 2024-04-12 RX ORDER — SODIUM CHLORIDE 9 MG/ML
INJECTION, SOLUTION INTRAVENOUS PRN
Status: DISCONTINUED | OUTPATIENT
Start: 2024-04-12 | End: 2024-04-12 | Stop reason: HOSPADM

## 2024-04-12 RX ORDER — MIDAZOLAM HYDROCHLORIDE 2 MG/2ML
2 INJECTION, SOLUTION INTRAMUSCULAR; INTRAVENOUS
Status: DISCONTINUED | OUTPATIENT
Start: 2024-04-12 | End: 2024-04-12 | Stop reason: HOSPADM

## 2024-04-12 RX ORDER — PROCHLORPERAZINE EDISYLATE 5 MG/ML
5 INJECTION INTRAMUSCULAR; INTRAVENOUS
Status: DISCONTINUED | OUTPATIENT
Start: 2024-04-12 | End: 2024-04-12 | Stop reason: HOSPADM

## 2024-04-12 RX ORDER — HYDROMORPHONE HYDROCHLORIDE 2 MG/ML
0.5 INJECTION, SOLUTION INTRAMUSCULAR; INTRAVENOUS; SUBCUTANEOUS EVERY 5 MIN PRN
Status: DISCONTINUED | OUTPATIENT
Start: 2024-04-12 | End: 2024-04-12 | Stop reason: HOSPADM

## 2024-04-12 RX ORDER — BUPIVACAINE HYDROCHLORIDE 2.5 MG/ML
INJECTION, SOLUTION EPIDURAL; INFILTRATION; INTRACAUDAL PRN
Status: DISCONTINUED | OUTPATIENT
Start: 2024-04-12 | End: 2024-04-12 | Stop reason: ALTCHOICE

## 2024-04-12 RX ORDER — LIDOCAINE HYDROCHLORIDE 10 MG/ML
1 INJECTION, SOLUTION INFILTRATION; PERINEURAL
Status: DISCONTINUED | OUTPATIENT
Start: 2024-04-12 | End: 2024-04-12 | Stop reason: HOSPADM

## 2024-04-12 RX ORDER — HALOPERIDOL 5 MG/ML
1 INJECTION INTRAMUSCULAR
Status: DISCONTINUED | OUTPATIENT
Start: 2024-04-12 | End: 2024-04-12 | Stop reason: HOSPADM

## 2024-04-12 RX ORDER — ACETAMINOPHEN AND CODEINE PHOSPHATE 300; 30 MG/1; MG/1
1 TABLET ORAL EVERY 4 HOURS PRN
Qty: 12 TABLET | Refills: 0 | Status: SHIPPED | OUTPATIENT
Start: 2024-04-12 | End: 2024-04-15

## 2024-04-12 RX ORDER — OXYCODONE HYDROCHLORIDE 5 MG/1
5 TABLET ORAL
Status: DISCONTINUED | OUTPATIENT
Start: 2024-04-12 | End: 2024-04-12 | Stop reason: HOSPADM

## 2024-04-12 RX ORDER — NALOXONE HYDROCHLORIDE 0.4 MG/ML
INJECTION, SOLUTION INTRAMUSCULAR; INTRAVENOUS; SUBCUTANEOUS PRN
Status: DISCONTINUED | OUTPATIENT
Start: 2024-04-12 | End: 2024-04-12 | Stop reason: HOSPADM

## 2024-04-12 RX ORDER — PROPOFOL 10 MG/ML
INJECTION, EMULSION INTRAVENOUS PRN
Status: DISCONTINUED | OUTPATIENT
Start: 2024-04-12 | End: 2024-04-12 | Stop reason: SDUPTHER

## 2024-04-12 RX ORDER — ACETAMINOPHEN 500 MG
1000 TABLET ORAL ONCE
Status: COMPLETED | OUTPATIENT
Start: 2024-04-12 | End: 2024-04-12

## 2024-04-12 RX ORDER — FENTANYL CITRATE 50 UG/ML
100 INJECTION, SOLUTION INTRAMUSCULAR; INTRAVENOUS
Status: DISCONTINUED | OUTPATIENT
Start: 2024-04-12 | End: 2024-04-12 | Stop reason: HOSPADM

## 2024-04-12 RX ORDER — IPRATROPIUM BROMIDE AND ALBUTEROL SULFATE 2.5; .5 MG/3ML; MG/3ML
1 SOLUTION RESPIRATORY (INHALATION)
Status: DISCONTINUED | OUTPATIENT
Start: 2024-04-12 | End: 2024-04-12 | Stop reason: HOSPADM

## 2024-04-12 RX ORDER — LIDOCAINE HYDROCHLORIDE 20 MG/ML
INJECTION, SOLUTION EPIDURAL; INFILTRATION; INTRACAUDAL; PERINEURAL PRN
Status: DISCONTINUED | OUTPATIENT
Start: 2024-04-12 | End: 2024-04-12 | Stop reason: SDUPTHER

## 2024-04-12 RX ORDER — LIDOCAINE HYDROCHLORIDE 10 MG/ML
INJECTION, SOLUTION INFILTRATION; PERINEURAL PRN
Status: DISCONTINUED | OUTPATIENT
Start: 2024-04-12 | End: 2024-04-12 | Stop reason: ALTCHOICE

## 2024-04-12 RX ORDER — SODIUM CHLORIDE, SODIUM LACTATE, POTASSIUM CHLORIDE, CALCIUM CHLORIDE 600; 310; 30; 20 MG/100ML; MG/100ML; MG/100ML; MG/100ML
INJECTION, SOLUTION INTRAVENOUS CONTINUOUS
Status: DISCONTINUED | OUTPATIENT
Start: 2024-04-12 | End: 2024-04-12 | Stop reason: HOSPADM

## 2024-04-12 RX ADMIN — PROPOFOL 25 MG: 10 INJECTION, EMULSION INTRAVENOUS at 07:11

## 2024-04-12 RX ADMIN — LIDOCAINE HYDROCHLORIDE 20 MG: 20 INJECTION, SOLUTION EPIDURAL; INFILTRATION; INTRACAUDAL; PERINEURAL at 07:08

## 2024-04-12 RX ADMIN — ACETAMINOPHEN 1000 MG: 500 TABLET ORAL at 06:39

## 2024-04-12 RX ADMIN — SODIUM CHLORIDE, POTASSIUM CHLORIDE, SODIUM LACTATE AND CALCIUM CHLORIDE: 600; 310; 30; 20 INJECTION, SOLUTION INTRAVENOUS at 06:38

## 2024-04-12 RX ADMIN — PROPOFOL 25 MG: 10 INJECTION, EMULSION INTRAVENOUS at 07:14

## 2024-04-12 RX ADMIN — PROPOFOL 25 MG: 10 INJECTION, EMULSION INTRAVENOUS at 07:08

## 2024-04-12 ASSESSMENT — PAIN SCALES - GENERAL: PAINLEVEL_OUTOF10: 4

## 2024-04-12 NOTE — DISCHARGE INSTRUCTIONS
Hand Surgery Postoperative  Instructions:      Weightbearing or Lifting:  As tolerated     Dressing  instructions:    Keep  your  dressing clean  and  dry  at  all  times.    You  can  remove  your  dressing  on  post-operative  day  #3  and  change  with  a  dry/sterile  dressing  or  Band-Aids  as  needed  thereafter.          Showering  Instructions:  May  shower  But keep surgical dressing clean and dry until removed as explained above. After dressing is removed, you may allow soapy water to run through the incision during showers but do not scrub. After each shower, pat dry and apply a dry dressing. Do  not  soak  your  Incision in still water or bathtub  for  3  weeks  after  surgery.    If  the  incision  gets  wet otherwise,  pat  dry  and  do  not  scrub  the  incision.  Do  not  apply  cream, ointment  or  lotion  to  incision      Pain  Control:  - You  have  been  given  a  prescription  to  be  taken  as  directed  for  post-operative  pain  control.    In  addition,  elevate  the  operative  extremity  above  the  heart  at  all  times  to  prevent  swelling  and  throbbing  pain.   - If you develop constipation while taking narcotic pain medications (Norco, Hydrocodone, Percocet, Oxycodone, Dilaudid, Hydromorphone) take  over-the-counter  Colace,  100mg  by  mouth  twice  a  Day.     - Nausea  is  a  common  side  effect  of  many  pain  medications.  You  will  want  to  eat something  before  taking  your  pain  medicine  to  help  prevent  Nausea.  - If  you  are  taking  a  prescription  pain  medication  that  contains  acetaminophen,  we  recommend  that  you  do  not  take  additional  over  the  counter  acetaminophen  (Tylenol®).      Other  pain  relieving  options:   - Using  a  cold  pack  to  ice  the  affected  area  a  few  times  a  day  (15  to  20  minutes  at  a  time)  can  help  to  relieve  pain,  reduce  swelling  and  bruising.      - Elevation  of  the  affected  area  can  risk to your health.    Obesity, smoking, and sedentary lifestyle greatly increases your risk for illness    A healthy diet, regular physical exercise & weight monitoring are important for maintaining a healthy lifestyle    You may be retaining fluid if you have a history of heart failure or if you experience any of the following symptoms:  Weight gain of 3 pounds or more overnight or 5 pounds in a week, increased swelling in our hands or feet or shortness of breath while lying flat in bed.  Please call your doctor as soon as you notice any of these symptoms; do not wait until your next office visit.    Recognize signs and symptoms of STROKE:    F-face looks uneven    A-arms unable to move or move unevenly    S-speech slurred or non-existent    T-time-call 911 as soon as signs and symptoms begin-DO NOT go       Back to bed or wait to see if you get better-TIME IS BRAIN.

## 2024-04-12 NOTE — PROGRESS NOTES
Prayer provided during Pre-op as requested by the the patient. He had a friend with him.      Dominick Ugalde MDIV, MRE

## 2024-04-12 NOTE — ANESTHESIA PRE PROCEDURE
Applicable):   Lab Results   Component Value Date/Time    COVID19 Not Detected 10/12/2020 11:27 AM           Anesthesia Evaluation  Patient summary reviewed and Nursing notes reviewed   no history of anesthetic complications:   Airway: Mallampati: II     Neck ROM: full  Mouth opening: > = 3 FB   Dental: normal exam         Pulmonary:Negative Pulmonary ROS breath sounds clear to auscultation                             Cardiovascular:Negative CV ROS  Exercise tolerance: good (>4 METS)  (+) hyperlipidemia        Rhythm: regular  Rate: normal                    Neuro/Psych:                ROS comment: DDD  Spinal stenosis GI/Hepatic/Renal: Neg GI/Hepatic/Renal ROS            Endo/Other: Negative Endo/Other ROS                    Abdominal:             Vascular: negative vascular ROS.         Other Findings:       Anesthesia Plan      TIVA     ASA 2       Induction: intravenous.      Anesthetic plan and risks discussed with patient.                    SIXTO MAGALLANES MD   4/12/2024

## 2024-04-12 NOTE — OP NOTE
Hand Surgery Operative Note      Howie Ambrocio   59 y.o.   male      Pre-op diagnosis: Left Carpal Tunnel syndrome  Post op diagnosis: same      Procedure: Left Ultrasound-Guided Carpal Tunnel release cpt 12748, 10558     Surgeon: Marcy Acosta MD     Anesthesia: Local + MAC      Tourniquet time: * No tourniquets in log *      Procedure indications: Patient with radial digit numbness recalcitrant to conservative measures with positive documentation of NCV findings consistent with carpal tunnel syndrome. After Thorough discussion, the patient decided to proceed with surgical management. We discussed in detail surgical risks including scar, pain, bleeding, infection, anesthetic risks, neurovascular injury, need for further surgery,  weakness, stiffness, risk of death and potential risk of other unforseen complication.      Procedure description:      Patient was placed in the supine position and after appropriate time-out and side, site and procedure confirmed.     Using a sterile ultrasound cover and sterile gel, relevant anatomical landmarks were identified, including but not limited to the median nerve, thenar motor branch/recurrent motor branch of the median nerve, palmar cutaneous branch of the median nerve, median and ulnar digital nerves and any visualized communications, ulnar vessels and superficial palmar arch, palmar fascia and distal transverse carpal ligament. A sterile ink marker was used to frankie the borders of the transverse and longitudinal safe zones as well as the incision site in the proximal carpal tunnel region. The safe zones were re-scanned to ensure acceptable anatomy and sonographic visualization.    A regional anesthetic was administered. A 25 gauge] needle was used to create a small skin wheal at the incision site using 2 ml of buffered 1% lidocaine plain. Following this, under the guidance of ultrasound local anesthesia was delivered deep and superficial to the transverse carpal

## 2024-04-18 LAB — NONINV COLON CA DNA+OCC BLD SCRN STL QL: NEGATIVE

## 2024-04-25 ENCOUNTER — OFFICE VISIT (OUTPATIENT)
Dept: ORTHOPEDIC SURGERY | Age: 59
End: 2024-04-25

## 2024-04-25 DIAGNOSIS — G56.02 CARPAL TUNNEL SYNDROME, LEFT: Primary | ICD-10-CM

## 2024-04-25 PROCEDURE — 99024 POSTOP FOLLOW-UP VISIT: CPT | Performed by: ORTHOPAEDIC SURGERY

## 2024-04-25 NOTE — PROGRESS NOTES
Orthopaedic Hand Surgery Note    Name: Howie Ambrocio  YOB: 1965  Gender: male  MRN: [id number]    HPI: Patient is status post Left ultrasound guided carpal tunnel release - Left on 4/12/2024. Patient reports sensation is improved. Night symptoms have resolved. No fevers or chills.    Physical Examination:  Wound healing well. Good finger and wrist range of motion. Sensation is improved from preoperative.    Assessment:   1. Carpal tunnel syndrome, left        Status post Left ultrasound guided carpal tunnel release - Left on 4/12/2024    Plan:  We discussed the patient can perform range of motion and strengthening exercises as tolerated. I have no activity restrictions at this time. Patient will follow up as needed    Marcy Acosta MD  Orthopaedic Surgery  04/25/24  6:53 PM

## 2024-09-25 NOTE — ANESTHESIA POSTPROCEDURE EVALUATION
Department of Anesthesiology  Postprocedure Note    Patient: Howie Ambrocio  MRN: [id number]  YOB: 1965  Date of evaluation: 4/12/2024    Procedure Summary       Date: 04/12/24 Room / Location: [facility] OP OR 02 / [facility]    Anesthesia Start: 0704 Anesthesia Stop: 0726    Procedure: Left ultrasound guided carpal tunnel release (Left: Hand) Diagnosis:       Carpal tunnel syndrome, left      (Carpal tunnel syndrome, left [G56.02])    Surgeons: Marcy Acosta MD Responsible Provider: Vladimir Fitch MD    Anesthesia Type: TIVA ASA Status: 2            Anesthesia Type: TIVA    Rosita Phase I: Rosita Score: 10    Rosita Phase II:      Anesthesia Post Evaluation    Patient location during evaluation: PACU  Patient participation: complete - patient participated  Level of consciousness: awake and alert  Airway patency: patent  Nausea & Vomiting: no nausea  Cardiovascular status: hemodynamically stable  Respiratory status: acceptable and nonlabored ventilation  Hydration status: stable  Multimodal analgesia pain management approach    No notable events documented.  
No/Not applicable

## 2024-11-01 SDOH — HEALTH STABILITY: PHYSICAL HEALTH: ON AVERAGE, HOW MANY DAYS PER WEEK DO YOU ENGAGE IN MODERATE TO STRENUOUS EXERCISE (LIKE A BRISK WALK)?: 3 DAYS

## 2024-11-04 ENCOUNTER — OFFICE VISIT (OUTPATIENT)
Dept: FAMILY MEDICINE CLINIC | Facility: CLINIC | Age: 59
End: 2024-11-04
Payer: COMMERCIAL

## 2024-11-04 VITALS
DIASTOLIC BLOOD PRESSURE: 80 MMHG | BODY MASS INDEX: 27.85 KG/M2 | HEART RATE: 77 BPM | WEIGHT: 188 LBS | SYSTOLIC BLOOD PRESSURE: 118 MMHG | TEMPERATURE: 98.3 F | HEIGHT: 69 IN | OXYGEN SATURATION: 100 %

## 2024-11-04 DIAGNOSIS — Z76.89 ENCOUNTER TO ESTABLISH CARE WITH NEW DOCTOR: ICD-10-CM

## 2024-11-04 DIAGNOSIS — Z11.4 SCREENING FOR HIV WITHOUT PRESENCE OF RISK FACTORS: ICD-10-CM

## 2024-11-04 DIAGNOSIS — Z98.890 STATUS POST CARPAL TUNNEL RELEASE: ICD-10-CM

## 2024-11-04 DIAGNOSIS — Z11.59 NEED FOR HEPATITIS C SCREENING TEST: ICD-10-CM

## 2024-11-04 DIAGNOSIS — E78.2 MIXED HYPERLIPIDEMIA: Primary | ICD-10-CM

## 2024-11-04 PROCEDURE — 99203 OFFICE O/P NEW LOW 30 MIN: CPT | Performed by: STUDENT IN AN ORGANIZED HEALTH CARE EDUCATION/TRAINING PROGRAM

## 2024-11-04 ASSESSMENT — ENCOUNTER SYMPTOMS
CHEST TIGHTNESS: 0
EYE DISCHARGE: 0
WHEEZING: 0
SINUS PAIN: 0
ABDOMINAL PAIN: 0
SHORTNESS OF BREATH: 0
DIARRHEA: 0
SORE THROAT: 0
SINUS PRESSURE: 0
BLOOD IN STOOL: 0
VOMITING: 0
NAUSEA: 0

## 2024-11-04 NOTE — PROGRESS NOTES
without impairment, glucose 96.    Thyroid screening normal    Lipid profile revealed mild dyslipidemia with , total cholesterol 194.    Cologuard screening was negative as of 4/10/2024    =========    Mr. Ambrocio now presents to myself in clinic for the first time on 11/4/2024.    Vital signs upon presentation stable and appropriate with blood pressure 118/80, pulse 77.  Patient afebrile with oxygen saturation 100% on room air.    Patient elaborates that while he was working with Dr. Kris Renae previously, they discussed dyslipidemia management with potential initiation of atorvastatin 10 mg.  Patient elected to trial dietary and exercise modification but now wishes to further evaluate status of his hyperlipidemia.  Not currently utilizing any prescribed pharmacotherapy.    Plan of care at the conclusion of today's visit on 11/4/2024 is as follows;  _Obtain updated comprehensive labs    Patient appropriate to maintain follow-up at 3 week interval.    Medication instructions and potential adverse effects discussed in detail.  ER precautions discussed.  Patient states agreement and understanding with regards to plan of care.    During today's examination we extensively reviewed prior laboratory study results, medication management and prior encounter notes. 35 minutes of patient facing time were utilized during today's encounter. Extensive medication management including potential adverse effects and ER precautions were discussed with the patient. Patient stated agreement and understanding with regards to plan of care.    Return in about 3 weeks (around 11/25/2024) for Lab Review, Virtual Visit.     Nicko Leal DO

## 2024-11-13 ENCOUNTER — LAB (OUTPATIENT)
Dept: FAMILY MEDICINE CLINIC | Facility: CLINIC | Age: 59
End: 2024-11-13

## 2024-11-13 DIAGNOSIS — Z11.59 NEED FOR HEPATITIS C SCREENING TEST: ICD-10-CM

## 2024-11-13 DIAGNOSIS — Z11.4 SCREENING FOR HIV WITHOUT PRESENCE OF RISK FACTORS: ICD-10-CM

## 2024-11-13 DIAGNOSIS — E78.2 MIXED HYPERLIPIDEMIA: ICD-10-CM

## 2024-11-13 DIAGNOSIS — Z76.89 ENCOUNTER TO ESTABLISH CARE WITH NEW DOCTOR: ICD-10-CM

## 2024-11-13 LAB
ALBUMIN SERPL-MCNC: 4.6 G/DL (ref 3.2–4.6)
ALBUMIN/GLOB SERPL: 1.3 (ref 1–1.9)
ALP SERPL-CCNC: 38 U/L (ref 40–129)
ALT SERPL-CCNC: 17 U/L (ref 8–55)
ANION GAP SERPL CALC-SCNC: 10 MMOL/L (ref 7–16)
AST SERPL-CCNC: 25 U/L (ref 15–37)
BASOPHILS # BLD: 0.1 K/UL (ref 0–0.2)
BASOPHILS NFR BLD: 2 % (ref 0–2)
BILIRUB SERPL-MCNC: 1.2 MG/DL (ref 0–1.2)
BUN SERPL-MCNC: 12 MG/DL (ref 8–23)
CALCIUM SERPL-MCNC: 10 MG/DL (ref 8.8–10.2)
CHLORIDE SERPL-SCNC: 100 MMOL/L (ref 98–107)
CHOLEST SERPL-MCNC: 201 MG/DL (ref 0–200)
CO2 SERPL-SCNC: 29 MMOL/L (ref 20–29)
CREAT SERPL-MCNC: 1.19 MG/DL (ref 0.8–1.3)
CREAT UR-MCNC: 164 MG/DL (ref 39–259)
DIFFERENTIAL METHOD BLD: ABNORMAL
EOSINOPHIL # BLD: 0.1 K/UL (ref 0–0.8)
EOSINOPHIL NFR BLD: 2 % (ref 0.5–7.8)
ERYTHROCYTE [DISTWIDTH] IN BLOOD BY AUTOMATED COUNT: 11.5 % (ref 11.9–14.6)
GLOBULIN SER CALC-MCNC: 3.4 G/DL (ref 2.3–3.5)
GLUCOSE SERPL-MCNC: 95 MG/DL (ref 70–99)
HCT VFR BLD AUTO: 49.4 % (ref 41.1–50.3)
HCV AB SER QL: NONREACTIVE
HDLC SERPL-MCNC: 46 MG/DL (ref 40–60)
HDLC SERPL: 4.4 (ref 0–5)
HGB BLD-MCNC: 16.8 G/DL (ref 13.6–17.2)
HIV 1+2 AB+HIV1 P24 AG SERPL QL IA: NONREACTIVE
HIV 1/2 RESULT COMMENT: NORMAL
IMM GRANULOCYTES # BLD AUTO: 0 K/UL (ref 0–0.5)
IMM GRANULOCYTES NFR BLD AUTO: 0 % (ref 0–5)
LDLC SERPL CALC-MCNC: 138 MG/DL (ref 0–100)
LYMPHOCYTES # BLD: 1.5 K/UL (ref 0.5–4.6)
LYMPHOCYTES NFR BLD: 31 % (ref 13–44)
MCH RBC QN AUTO: 30.5 PG (ref 26.1–32.9)
MCHC RBC AUTO-ENTMCNC: 34 G/DL (ref 31.4–35)
MCV RBC AUTO: 89.7 FL (ref 82–102)
MICROALBUMIN UR-MCNC: <1.2 MG/DL (ref 0–20)
MICROALBUMIN/CREAT UR-RTO: NORMAL MG/G (ref 0–30)
MONOCYTES # BLD: 0.4 K/UL (ref 0.1–1.3)
MONOCYTES NFR BLD: 8 % (ref 4–12)
NEUTS SEG # BLD: 2.9 K/UL (ref 1.7–8.2)
NEUTS SEG NFR BLD: 57 % (ref 43–78)
NRBC # BLD: 0 K/UL (ref 0–0.2)
PLATELET # BLD AUTO: 241 K/UL (ref 150–450)
PMV BLD AUTO: 10.2 FL (ref 9.4–12.3)
POTASSIUM SERPL-SCNC: 4.3 MMOL/L (ref 3.5–5.1)
PROT SERPL-MCNC: 8 G/DL (ref 6.3–8.2)
RBC # BLD AUTO: 5.51 M/UL (ref 4.23–5.6)
SODIUM SERPL-SCNC: 139 MMOL/L (ref 136–145)
TRIGL SERPL-MCNC: 86 MG/DL (ref 0–150)
TSH W FREE THYROID IF ABNORMAL: 1.35 UIU/ML (ref 0.27–4.2)
VLDLC SERPL CALC-MCNC: 17 MG/DL (ref 6–23)
WBC # BLD AUTO: 4.9 K/UL (ref 4.3–11.1)

## 2024-11-27 ENCOUNTER — TELEMEDICINE (OUTPATIENT)
Dept: FAMILY MEDICINE CLINIC | Facility: CLINIC | Age: 59
End: 2024-11-27
Payer: COMMERCIAL

## 2024-11-27 DIAGNOSIS — E78.2 MIXED HYPERLIPIDEMIA: Primary | ICD-10-CM

## 2024-11-27 PROCEDURE — 99214 OFFICE O/P EST MOD 30 MIN: CPT | Performed by: STUDENT IN AN ORGANIZED HEALTH CARE EDUCATION/TRAINING PROGRAM

## 2024-11-27 RX ORDER — ROSUVASTATIN CALCIUM 10 MG/1
10 TABLET, COATED ORAL DAILY
Qty: 90 TABLET | Refills: 2 | Status: SHIPPED | OUTPATIENT
Start: 2024-11-27

## 2024-11-27 RX ORDER — UBIDECARENONE 75 MG
50 CAPSULE ORAL DAILY
COMMUNITY

## 2024-11-27 ASSESSMENT — ENCOUNTER SYMPTOMS
SHORTNESS OF BREATH: 0
ABDOMINAL PAIN: 0
SORE THROAT: 0
BLOOD IN STOOL: 0
EYE DISCHARGE: 0
SINUS PRESSURE: 0
CHEST TIGHTNESS: 0
VOMITING: 0
WHEEZING: 0
SINUS PAIN: 0
DIARRHEA: 0
NAUSEA: 0

## 2024-11-27 NOTE — PROGRESS NOTES
Howie Abmrocio, was evaluated through a synchronous (real-time) audio-video encounter. The patient (or guardian if applicable) is aware that this is a billable service, which includes applicable co-pays. This Virtual Visit was conducted with patient's (and/or legal guardian's) consent. Patient identification was verified, and a caregiver was present when appropriate.   The patient was located at Home: [address] [place] [id number]  Provider was located at Facility (Appt Dept): [address] [place] [id number]  Confirm you are appropriately licensed, registered, or certified to deliver care in the state where the patient is located as indicated above. If you are not or unsure, please re-schedule the visit: Yes, I confirm.     Howie Ambrocio (:  1965) is presenting virtually for evaluation of the following:    Howie Ambrocio is a 59-year-old male recently evaluated by myself in clinic for the first time on 2024.  Detailed prior encounter note available for review.  Since her prior visit the patient completed updated laboratory studies and now presents via telemedicine for discussion of results and continued management on 2024.        Review of Systems   Constitutional:  Negative for appetite change, chills and fever.   HENT:  Negative for sinus pressure, sinus pain and sore throat.    Eyes:  Negative for discharge.   Respiratory:  Negative for chest tightness, shortness of breath and wheezing.    Cardiovascular:  Negative for chest pain and palpitations.   Gastrointestinal:  Negative for abdominal pain, blood in stool, diarrhea, nausea and vomiting.   Endocrine: Negative for cold intolerance and heat intolerance.   Musculoskeletal:  Negative for gait problem.   Skin:  Negative for rash.   Neurological:  Negative for seizures, syncope, weakness and headaches.   Psychiatric/Behavioral:  Negative for behavioral problems, hallucinations and suicidal ideas.

## (undated) DEVICE — SOLUTION IRRIG 1000ML 0.9% SOD CHL USP POUR PLAS BTL

## (undated) DEVICE — CORD ES L12FT BPLR FRCP

## (undated) DEVICE — SHEET,DRAPE,53X77,STERILE: Brand: MEDLINE

## (undated) DEVICE — DRAPE,HAND,STERILE: Brand: MEDLINE

## (undated) DEVICE — SOL IRR SOD CL 0.9% 1000ML BTL --

## (undated) DEVICE — CONTAINER,SPECIMEN,O.R.STRL,4.5OZ: Brand: MEDLINE

## (undated) DEVICE — GEL US 20GM NONIRRITATING OVERWRAPPED FILE PCH TRNSMIT

## (undated) DEVICE — INTENDED FOR TISSUE SEPARATION, AND OTHER PROCEDURES THAT REQUIRE A SHARP SURGICAL BLADE TO PUNCTURE OR CUT.: Brand: BARD-PARKER SAFETY BLADES SIZE 15, STERILE

## (undated) DEVICE — CATHETER IV 14GA L1.25IN PTFE ORNG FEP SFTY STR HUB RADPQ

## (undated) DEVICE — SPONGE: SPECIALTY PEANUT XR 100/CS: Brand: MEDICAL ACTION INDUSTRIES

## (undated) DEVICE — SUTURE NONABSORBABLE MONOFILAMENT 3-0 PS-1 18 IN BLK ETHILON 1663H

## (undated) DEVICE — PAD,NON-ADHERENT,3X8,STERILE,LF,1/PK: Brand: MEDLINE

## (undated) DEVICE — NEEDLE SPNL 22GA L3.5IN BLK HUB S STL REG WALL FIT STYL W/

## (undated) DEVICE — DRAPE IRRIG FLD WRM W44XL44IN W/ AORN STD PRTBL INTRATEMP

## (undated) DEVICE — NEEDLE HYPO 25GA L1.5IN BLU POLYPR HUB S STL REG BVL STR

## (undated) DEVICE — 48" PROBE COVER W/GEL, ULTRASOUND, STERILE: Brand: SITE-RITE

## (undated) DEVICE — SURGIFOAM SPNG SZ 100

## (undated) DEVICE — SUTURE VCRL + SZ 3-0 L18IN ABSRB UD SH 1/2 CIR TAPERCUT NDL VCP864D

## (undated) DEVICE — SPINE NEURO: Brand: MEDLINE INDUSTRIES, INC.

## (undated) DEVICE — DERMABOND SKIN ADH 0.7ML -- DERMABOND ADVANCED 12/BX

## (undated) DEVICE — BANDAGE,GAUZE,BULKEE II,4.5"X4.1YD,STRL: Brand: MEDLINE

## (undated) DEVICE — RUBBERBAND FASTENING W0.25XL3.5IN 5 PER PK

## (undated) DEVICE — SYRINGE IRRIG 60ML SFT PLIABLE BLB EZ TO GRP 1 HND USE W/

## (undated) DEVICE — SUTURE VCRL SZ 3-0 L18IN ABSRB UD L26MM SH 1/2 CIR J864D

## (undated) DEVICE — DRESSING,GAUZE,XEROFORM,CURAD,1"X8",ST: Brand: CURAD

## (undated) DEVICE — SPONGE,NEURO,0.5"X0.5",XR,STRL,10/PK: Brand: MEDLINE

## (undated) DEVICE — DRAPE,U/SHT,SPLIT,FILM,60X84,STERILE: Brand: MEDLINE

## (undated) DEVICE — GLOVE SURG SZ 65 THK91MIL LTX FREE SYN POLYISOPRENE

## (undated) DEVICE — AGENT HEMSTAT 8ML FLX TIP MTRX + DISP SURGIFLO

## (undated) DEVICE — TOTAL TRAY, DB, 100% SILI FOLEY, 16FR 10: Brand: MEDLINE

## (undated) DEVICE — HAND PACK: Brand: MEDLINE INDUSTRIES, INC.

## (undated) DEVICE — INTENDED FOR TISSUE SEPARATION, AND OTHER PROCEDURES THAT REQUIRE A SHARP SURGICAL BLADE TO PUNCTURE OR CUT.: Brand: BARD-PARKER SAFETY BLADES SIZE 10, STERILE

## (undated) DEVICE — CARBIDE MATCH HEAD

## (undated) DEVICE — PADDING CAST W3INXL4YD COT BLEND MIC PLEAT UNDERCAST SPEC

## (undated) DEVICE — STOCKINETTE,DOUBLE PLY,4X48,STERILE: Brand: MEDLINE

## (undated) DEVICE — KIT EVAC 0.13IN RECT TB DIA10FR 400CC PVC 3 SPR Y CONN DRN

## (undated) DEVICE — SX-ONE MICROKNIFE IS REBRANDED AS ULTRAGUIDECTR.  ULTRAGUIDECTR IS INTENDED TO TRANSECT THE TRANSVERSE CARPAL LIGAMENT FOR THE TREATMENT OF CARPAL TUNNEL SYNDROME.ULTRAGUIDECTR IS A DISPOSABLE DEVICE INTENDED TO CREATE SPACE WITHIN THE CARPAL TUNNEL AND TRANSECT THE TRANSVERSE CARPAL LIGAMENT (TCL) FOR THE TREATMENT OF CARPAL TUNNEL SYNDROME.: Brand: SX-ONE MICROKNIFE

## (undated) DEVICE — DRAPE TWL SURG 16X26IN BLU ORB04] ALLCARE INC]

## (undated) DEVICE — BONE WAX WHITE: Brand: BONE WAX WHITE

## (undated) DEVICE — SPONGE GZ W4XL4IN COT 12 PLY TYP VII WVN C FLD DSGN STERILE

## (undated) DEVICE — REM POLYHESIVE ADULT PATIENT RETURN ELECTRODE: Brand: VALLEYLAB

## (undated) DEVICE — CASPAR DISTR PIN12MMSTER: Brand: AESCULAP

## (undated) DEVICE — BANDAGE COMPR L5YDXW2IN FOAM CO FLX

## (undated) DEVICE — INSULATED BLADE ELECTRODE: Brand: EDGE

## (undated) DEVICE — (D)PREP SKN CHLRAPRP APPL 26ML -- CONVERT TO ITEM 371833

## (undated) DEVICE — SUTURE VCRL SZ 2-0 L18IN ABSRB UD L26MM CP-2 1/2 CIR REV J762D

## (undated) DEVICE — SURGICAL PROCEDURE PACK BASIC ST FRANCIS

## (undated) DEVICE — GOWN,BREATHABLE SLV,AURORA,LG,STRL: Brand: MEDLINE

## (undated) DEVICE — SYR 10ML LUER LOK 1/5ML GRAD --

## (undated) DEVICE — INTENDED FOR TISSUE SEPARATION, AND OTHER PROCEDURES THAT REQUIRE A SHARP SURGICAL BLADE TO PUNCTURE OR CUT.: Brand: BARD-PARKER ® STAINLESS STEEL BLADES

## (undated) DEVICE — DRAPE MICSCP DISPOSABLE

## (undated) DEVICE — NEEDLE HYPO 21GA L1.5IN INTRAMUSCULAR S STL LATCH BVL UP

## (undated) DEVICE — GLOVE SURG SZ 65 L12IN FNGR THK79MIL GRN LTX FREE

## (undated) DEVICE — CODMAN VERSATRU™ STANDARD DISPOSABLE NON-STICK BIPOLAR FORCEPS 8" (20CM), 1.0MM TIP: Brand: CODMAN VERSATRU™

## (undated) DEVICE — APPLICATOR MEDICATED 26 CC SOLUTION HI LT ORNG CHLORAPREP

## (undated) DEVICE — TUBING, SUCTION, 1/4" X 10', STRAIGHT: Brand: MEDLINE

## (undated) DEVICE — SUTURE MCRYL SZ 4-0 L27IN ABSRB UD L19MM PS-2 1/2 CIR PRIM Y426H

## (undated) DEVICE — BNDG,ELSTC,MATRIX,STRL,4"X5YD,LF,HOOK&LP: Brand: MEDLINE

## (undated) DEVICE — 2000CC GUARDIAN II: Brand: GUARDIAN

## (undated) DEVICE — DRILL BIT

## (undated) DEVICE — SYR 3ML LL TIP 1/10ML GRAD --

## (undated) DEVICE — DRAPE SHT 3 QTR PROXIMA 53X77 --

## (undated) DEVICE — DRAPE XR C ARM 41X74IN LF --